# Patient Record
Sex: FEMALE | Race: WHITE | NOT HISPANIC OR LATINO | Employment: UNEMPLOYED | URBAN - METROPOLITAN AREA
[De-identification: names, ages, dates, MRNs, and addresses within clinical notes are randomized per-mention and may not be internally consistent; named-entity substitution may affect disease eponyms.]

---

## 2022-08-11 ENCOUNTER — INITIAL PRENATAL (OUTPATIENT)
Dept: OBSTETRICS AND GYNECOLOGY | Facility: CLINIC | Age: 32
End: 2022-08-11

## 2022-08-11 VITALS — SYSTOLIC BLOOD PRESSURE: 120 MMHG | WEIGHT: 194 LBS | DIASTOLIC BLOOD PRESSURE: 72 MMHG

## 2022-08-11 DIAGNOSIS — Z34.01 ENCOUNTER FOR SUPERVISION OF NORMAL FIRST PREGNANCY IN FIRST TRIMESTER: Primary | ICD-10-CM

## 2022-08-11 LAB
GLUCOSE UR STRIP-MCNC: NEGATIVE MG/DL
PROT UR STRIP-MCNC: NEGATIVE MG/DL

## 2022-08-11 PROCEDURE — 0501F PRENATAL FLOW SHEET: CPT | Performed by: OBSTETRICS & GYNECOLOGY

## 2022-08-11 NOTE — PROGRESS NOTES
CC initial prenatal visit for this 32-year-old G1, P0 female currently using LMP EDC 3/31/23.  We will get ultrasound today to verify date and viability.  Patient does have a 10-year history of a right ovarian cyst without blood flow that was felt to be possible dermoid or endometrioma in [place].  She will be age 32 at delivery and may want Materna 21 testing but unsure about CF testing  Review of Systems - ENT ROS: negative  Hematological and Lymphatic ROS: negative  Endocrine ROS: negative  Breast ROS: negative  Respiratory ROS: negative  Cardiovascular ROS: negative  Gastrointestinal ROS: negative except nausea  Genito-Urinary ROS: negative  Musculoskeletal ROS: negative  Neurological ROS: negative  Dermatological ROS: negative  Assessment.  6 weeks 6 days gestation with low risk pregnancy  Plan.  Ultrasound for dating and viability today.  Routine prenatal labs.  Possible CF testing with Materna 21 next visit in 4 weeks.

## 2022-08-12 LAB
ABO GROUP BLD: NORMAL
BACTERIA UR CULT: NORMAL
BACTERIA UR CULT: NORMAL
BASOPHILS # BLD AUTO: 0 X10E3/UL (ref 0–0.2)
BASOPHILS NFR BLD AUTO: 0 %
BLD GP AB SCN SERPL QL: NEGATIVE
EOSINOPHIL # BLD AUTO: 0.1 X10E3/UL (ref 0–0.4)
EOSINOPHIL NFR BLD AUTO: 1 %
ERYTHROCYTE [DISTWIDTH] IN BLOOD BY AUTOMATED COUNT: 13 % (ref 11.7–15.4)
EST. AVERAGE GLUCOSE BLD GHB EST-MCNC: 100 MG/DL
FT4I SERPL CALC-MCNC: 2.6 (ref 1.2–4.9)
HBA1C MFR BLD: 5.1 % (ref 4.8–5.6)
HBV SURFACE AG SERPL QL IA: NEGATIVE
HCT VFR BLD AUTO: 38.7 % (ref 34–46.6)
HCV AB S/CO SERPL IA: 0.2 S/CO RATIO (ref 0–0.9)
HGB A MFR BLD ELPH: 97.6 % (ref 96.4–98.8)
HGB A2 MFR BLD ELPH: 2.4 % (ref 1.8–3.2)
HGB BLD-MCNC: 13.2 G/DL (ref 11.1–15.9)
HGB F MFR BLD ELPH: 0 % (ref 0–2)
HGB FRACT BLD-IMP: NORMAL
HGB S MFR BLD ELPH: 0 %
HIV 1+2 AB+HIV1 P24 AG SERPL QL IA: NON REACTIVE
IMM GRANULOCYTES # BLD AUTO: 0 X10E3/UL (ref 0–0.1)
IMM GRANULOCYTES NFR BLD AUTO: 0 %
LYMPHOCYTES # BLD AUTO: 2.9 X10E3/UL (ref 0.7–3.1)
LYMPHOCYTES NFR BLD AUTO: 34 %
MCH RBC QN AUTO: 29.3 PG (ref 26.6–33)
MCHC RBC AUTO-ENTMCNC: 34.1 G/DL (ref 31.5–35.7)
MCV RBC AUTO: 86 FL (ref 79–97)
MONOCYTES # BLD AUTO: 0.6 X10E3/UL (ref 0.1–0.9)
MONOCYTES NFR BLD AUTO: 7 %
NEUTROPHILS # BLD AUTO: 5 X10E3/UL (ref 1.4–7)
NEUTROPHILS NFR BLD AUTO: 58 %
PLATELET # BLD AUTO: 268 X10E3/UL (ref 150–450)
RBC # BLD AUTO: 4.5 X10E6/UL (ref 3.77–5.28)
RH BLD: POSITIVE
RPR SER QL: NON REACTIVE
RUBV IGG SERPL IA-ACNC: 8.58 INDEX
T3RU NFR SERPL: 19 % (ref 24–39)
T4 SERPL-MCNC: 13.9 UG/DL (ref 4.5–12)
TSH SERPL DL<=0.005 MIU/L-ACNC: 2.04 UIU/ML (ref 0.45–4.5)
WBC # BLD AUTO: 8.7 X10E3/UL (ref 3.4–10.8)

## 2022-08-13 LAB
AMPHETAMINES UR QL SCN: NEGATIVE NG/ML
BARBITURATES UR QL SCN: NEGATIVE NG/ML
BENZODIAZ UR QL: NEGATIVE NG/ML
BZE UR QL: NEGATIVE NG/ML
CANNABINOIDS UR QL SCN: NEGATIVE NG/ML
METHADONE UR QL SCN: NEGATIVE NG/ML
OPIATES UR QL: NEGATIVE NG/ML
PCP UR QL: NEGATIVE NG/ML
PROPOXYPH UR QL SCN: NEGATIVE NG/ML

## 2022-08-15 LAB
C TRACH RRNA CVX QL NAA+PROBE: NEGATIVE
CONV .: NORMAL
CYTOLOGIST CVX/VAG CYTO: NORMAL
CYTOLOGY CVX/VAG DOC CYTO: NORMAL
CYTOLOGY CVX/VAG DOC THIN PREP: NORMAL
DX ICD CODE: NORMAL
HIV 1 & 2 AB SER-IMP: NORMAL
N GONORRHOEA RRNA CVX QL NAA+PROBE: NEGATIVE
OTHER STN SPEC: NORMAL
STAT OF ADQ CVX/VAG CYTO-IMP: NORMAL

## 2022-09-12 ENCOUNTER — ROUTINE PRENATAL (OUTPATIENT)
Dept: OBSTETRICS AND GYNECOLOGY | Facility: CLINIC | Age: 32
End: 2022-09-12

## 2022-09-12 VITALS — SYSTOLIC BLOOD PRESSURE: 120 MMHG | WEIGHT: 193 LBS | DIASTOLIC BLOOD PRESSURE: 82 MMHG

## 2022-09-12 DIAGNOSIS — Z34.01 ENCOUNTER FOR SUPERVISION OF NORMAL FIRST PREGNANCY IN FIRST TRIMESTER: Primary | ICD-10-CM

## 2022-09-12 LAB
GLUCOSE UR STRIP-MCNC: NEGATIVE MG/DL
PROT UR STRIP-MCNC: NEGATIVE MG/DL

## 2022-09-12 PROCEDURE — 0502F SUBSEQUENT PRENATAL CARE: CPT | Performed by: OBSTETRICS & GYNECOLOGY

## 2022-09-12 NOTE — PROGRESS NOTES
CC follow-up OB visit.  Ultrasound today fetal heart rate 165.  11 weeks 1 day.  Stable 2.4 cm complex right ovarian cyst.  We will keep LMP EDC 3/31/2023.  Normal labs today with normal hemoglobin electrophoresis and hemoglobin A1c.  Right ovarian cyst has been present per patient for years.  Patient does want CF and Materna 21 testing.  No current problems.  Assessment.  11 weeks 3 days gestation with persistent right ovarian cyst  Plan.  Materna 21 and Inheritest Core today.  Return to office 4 weeks.

## 2022-09-19 ENCOUNTER — TELEPHONE (OUTPATIENT)
Dept: OBSTETRICS AND GYNECOLOGY | Facility: CLINIC | Age: 32
End: 2022-09-19

## 2022-09-19 NOTE — TELEPHONE ENCOUNTER
----- Message from Chris Rocha MD sent at 9/19/2022  7:47 AM EDT -----  Please tell pt Materna-21 low risk for Down's. It's a boy if wants to know. Thanks nola

## 2022-09-21 LAB
CLINICAL INFO: NORMAL
ETHNIC BACKGROUND STATED: NORMAL
GENE MUT TESTED BLD/T: NORMAL
GENETIC COUNSELOR:: NORMAL
LAB DIRECTOR NAME PROVIDER: NORMAL
MOL DX INTERP BLD/T QL: NORMAL
REASON FOR REFERRAL (NARRATIVE): NORMAL
REF LAB TEST METHOD: NORMAL
SERVICE CMNT 02-IMP: NORMAL
SPECIMEN SOURCE: NORMAL
TEST PERFORMANCE INFO SPEC: NORMAL

## 2022-10-03 ENCOUNTER — TELEPHONE (OUTPATIENT)
Dept: OBSTETRICS AND GYNECOLOGY | Facility: CLINIC | Age: 32
End: 2022-10-03

## 2022-10-03 NOTE — TELEPHONE ENCOUNTER
Provider: DR. FRIDA HORTON  Caller: DANIA CHAMBERS  Relationship to Patient: SELF  Phone Number: [phone]  Reason for Call: HEART FLUTTERING   When did it start: 09/30/22    PT IS 14 1/2 WEEKS PREGNANT - PT'S HEART HAS BEEN FLUTTERING ON AND OFF SINCE Friday, September 30, 2020.  PT IS NOT IN ANY  PAIN - HAS APPT W/ DR. HORTON ON Monday, October 10TH FOR OB FOLLOW UP - PT IS WONDERING IF THERE IS ANYTHING THAT SHE NEEDS TO BE CONCERNED WITH?      PT CAN BE REACHED AT ANYTIME @ THE NUMBER PROVIDED ABOVE - LVM IF NEEDED.

## 2022-10-03 NOTE — TELEPHONE ENCOUNTER
I would explain to patient that at this point in her pregnancy it is not unusual to feel some palpitations or even have her heart racing for short periods of time as her body tries to keep her blood pressure up.  We can definitely check it when she comes in but if it is severe or getting worse she should go to the emergency room to get an EKG.  That is something we are not able to do in the office.

## 2022-10-10 ENCOUNTER — ROUTINE PRENATAL (OUTPATIENT)
Dept: OBSTETRICS AND GYNECOLOGY | Facility: CLINIC | Age: 32
End: 2022-10-10

## 2022-10-10 VITALS — DIASTOLIC BLOOD PRESSURE: 74 MMHG | SYSTOLIC BLOOD PRESSURE: 118 MMHG | WEIGHT: 195 LBS

## 2022-10-10 DIAGNOSIS — Z34.02 ENCOUNTER FOR SUPERVISION OF NORMAL FIRST PREGNANCY IN SECOND TRIMESTER: Primary | ICD-10-CM

## 2022-10-10 PROCEDURE — 0502F SUBSEQUENT PRENATAL CARE: CPT | Performed by: OBSTETRICS & GYNECOLOGY

## 2022-10-10 NOTE — PROGRESS NOTES
CC OB follow-up visit.  Materna 21 and CF testing normal.  Does want AFP today.  No current problems.  Patient has occasional palpitations but they are getting better.  Heart regular rate and rhythm without murmur  Assessment.  15 weeks 3 days gestation with low risk pregnancy  Plan.  Return to office 4 weeks with anatomy scan.  aFP today.

## 2022-10-12 LAB
AFP INTERP SERPL-IMP: NORMAL
AFP INTERP SERPL-IMP: NORMAL
AFP MOM SERPL: 0.79
AFP SERPL-MCNC: 21.8 NG/ML
AGE AT DELIVERY: 34.7 YR
GA METHOD: NORMAL
GA: 15.4 WEEKS
IDDM PATIENT QL: NO
LABORATORY COMMENT REPORT: NORMAL
MULTIPLE PREGNANCY: NO
NEURAL TUBE DEFECT RISK FETUS: NORMAL %
RESULT: NORMAL

## 2022-11-14 ENCOUNTER — ROUTINE PRENATAL (OUTPATIENT)
Dept: OBSTETRICS AND GYNECOLOGY | Facility: CLINIC | Age: 32
End: 2022-11-14

## 2022-11-14 VITALS
WEIGHT: 197.8 LBS | SYSTOLIC BLOOD PRESSURE: 130 MMHG | BODY MASS INDEX: 36.4 KG/M2 | HEIGHT: 62 IN | DIASTOLIC BLOOD PRESSURE: 69 MMHG

## 2022-11-14 DIAGNOSIS — Z34.02 ENCOUNTER FOR SUPERVISION OF NORMAL FIRST PREGNANCY IN SECOND TRIMESTER: Primary | ICD-10-CM

## 2022-11-14 LAB
GLUCOSE UR STRIP-MCNC: NEGATIVE MG/DL
PROT UR STRIP-MCNC: NEGATIVE MG/DL

## 2022-11-14 PROCEDURE — 0502F SUBSEQUENT PRENATAL CARE: CPT | Performed by: OBSTETRICS & GYNECOLOGY

## 2022-11-14 NOTE — PROGRESS NOTES
CC follow-up OB visit.  aFP low risk for spina bifida.  Normal anatomy scan today.  Fetal heart rate 140.  Cervical length 4 cm.  Same right dermoid cyst noted.  This has been present for years.  Discussed possible removal if  necessary.  Return to office 4 weeks with 1 hour glucose.

## 2022-12-12 ENCOUNTER — ROUTINE PRENATAL (OUTPATIENT)
Dept: OBSTETRICS AND GYNECOLOGY | Facility: CLINIC | Age: 32
End: 2022-12-12

## 2022-12-12 VITALS — WEIGHT: 202 LBS | BODY MASS INDEX: 36.95 KG/M2 | DIASTOLIC BLOOD PRESSURE: 78 MMHG | SYSTOLIC BLOOD PRESSURE: 126 MMHG

## 2022-12-12 DIAGNOSIS — Z34.02 ENCOUNTER FOR SUPERVISION OF NORMAL FIRST PREGNANCY IN SECOND TRIMESTER: ICD-10-CM

## 2022-12-12 LAB
GLUCOSE UR STRIP-MCNC: NEGATIVE MG/DL
PROT UR STRIP-MCNC: NEGATIVE MG/DL

## 2022-12-12 PROCEDURE — 0502F SUBSEQUENT PRENATAL CARE: CPT | Performed by: OBSTETRICS & GYNECOLOGY

## 2022-12-12 PROCEDURE — 90686 IIV4 VACC NO PRSV 0.5 ML IM: CPT | Performed by: OBSTETRICS & GYNECOLOGY

## 2022-12-12 PROCEDURE — 90471 IMMUNIZATION ADMIN: CPT | Performed by: OBSTETRICS & GYNECOLOGY

## 2022-12-12 NOTE — PROGRESS NOTES
CC follow-up OB visit.  AB+ blood type.  1 hour glucose today.  Normal CF testing.  Does want flu shot today and Tdap next visit.  Good fetal movement and growth.  No current problems.  Return to office 4 weeks.

## 2022-12-13 ENCOUNTER — TELEPHONE (OUTPATIENT)
Dept: OBSTETRICS AND GYNECOLOGY | Facility: CLINIC | Age: 32
End: 2022-12-13

## 2022-12-13 DIAGNOSIS — O99.810 ABNORMAL O'SULLIVAN GLUCOSE CHALLENGE TEST, ANTEPARTUM: Primary | ICD-10-CM

## 2022-12-13 LAB
BASOPHILS # BLD AUTO: 0.03 10*3/MM3 (ref 0–0.2)
BASOPHILS NFR BLD AUTO: 0.3 % (ref 0–1.5)
BLD GP AB SCN SERPL QL: NEGATIVE
EOSINOPHIL # BLD AUTO: 0.12 10*3/MM3 (ref 0–0.4)
EOSINOPHIL NFR BLD AUTO: 1.4 % (ref 0.3–6.2)
ERYTHROCYTE [DISTWIDTH] IN BLOOD BY AUTOMATED COUNT: 12.8 % (ref 12.3–15.4)
GLUCOSE 1H P 50 G GLC PO SERPL-MCNC: 161 MG/DL (ref 65–139)
HCT VFR BLD AUTO: 34.8 % (ref 34–46.6)
HGB BLD-MCNC: 12 G/DL (ref 12–15.9)
IMM GRANULOCYTES # BLD AUTO: 0.05 10*3/MM3 (ref 0–0.05)
IMM GRANULOCYTES NFR BLD AUTO: 0.6 % (ref 0–0.5)
LYMPHOCYTES # BLD AUTO: 2.69 10*3/MM3 (ref 0.7–3.1)
LYMPHOCYTES NFR BLD AUTO: 30.6 % (ref 19.6–45.3)
MCH RBC QN AUTO: 29.1 PG (ref 26.6–33)
MCHC RBC AUTO-ENTMCNC: 34.5 G/DL (ref 31.5–35.7)
MCV RBC AUTO: 84.3 FL (ref 79–97)
MONOCYTES # BLD AUTO: 0.39 10*3/MM3 (ref 0.1–0.9)
MONOCYTES NFR BLD AUTO: 4.4 % (ref 5–12)
NEUTROPHILS # BLD AUTO: 5.51 10*3/MM3 (ref 1.7–7)
NEUTROPHILS NFR BLD AUTO: 62.7 % (ref 42.7–76)
NRBC BLD AUTO-RTO: 0 /100 WBC (ref 0–0.2)
PLATELET # BLD AUTO: 243 10*3/MM3 (ref 140–450)
RBC # BLD AUTO: 4.13 10*6/MM3 (ref 3.77–5.28)
WBC # BLD AUTO: 8.79 10*3/MM3 (ref 3.4–10.8)

## 2022-12-13 NOTE — PROGRESS NOTES
Please tell patient that she needs a fasting 3-hour OGTT.  I have placed an order in the computer.  Thank you.

## 2022-12-13 NOTE — TELEPHONE ENCOUNTER
----- Message from Chris Rocha MD sent at 12/13/2022 11:49 AM EST -----  Please tell patient that she needs a fasting 3-hour OGTT.  I have placed an order in the computer.  Thank you.

## 2022-12-19 ENCOUNTER — LAB (OUTPATIENT)
Dept: OBSTETRICS AND GYNECOLOGY | Facility: CLINIC | Age: 32
End: 2022-12-19

## 2022-12-19 DIAGNOSIS — O99.810 ABNORMAL O'SULLIVAN GLUCOSE CHALLENGE TEST, ANTEPARTUM: Primary | ICD-10-CM

## 2022-12-20 ENCOUNTER — TELEPHONE (OUTPATIENT)
Dept: OBSTETRICS AND GYNECOLOGY | Facility: CLINIC | Age: 32
End: 2022-12-20

## 2022-12-20 LAB
GLUCOSE 1H P 100 G GLC PO SERPL-MCNC: 178 MG/DL (ref 70–179)
GLUCOSE 2H P 100 G GLC PO SERPL-MCNC: 137 MG/DL (ref 70–154)
GLUCOSE 3H P 100 G GLC PO SERPL-MCNC: 85 MG/DL (ref 70–139)
GLUCOSE P FAST SERPL-MCNC: 148 MG/DL (ref 70–94)
Lab: ABNORMAL

## 2022-12-20 NOTE — TELEPHONE ENCOUNTER
Pt aware and stated she did not eat or drink anything. If she did has some sips of water when getting up to got to the bathroom, but nothing other than that. Aware we will be doing another fasting blood sugar at her next appt and to not eat or drink anything after midnight. SM

## 2022-12-20 NOTE — PROGRESS NOTES
Fasting 3-hour OGTT normal except fasting blood sugar 148.  We will verify whether patient was actually fasting.  Regardless we will recheck fasting blood sugar next visit.

## 2022-12-20 NOTE — PROGRESS NOTES
Khushbu please find out if this patient was truly fasting when she did her fasting 3-hour OGTT.  Her fasting blood sugar is very high but the rest of her levels are totally normal.  Regardless I will want to do a fasting blood sugar the next time she comes in for an appointment and tell her it is very important that she fasts from after midnight.  Thank you.

## 2022-12-20 NOTE — TELEPHONE ENCOUNTER
----- Message from Chris Rocha MD sent at 12/20/2022  2:49 PM EST -----  Khushbu please find out if this patient was truly fasting when she did her fasting 3-hour OGTT.  Her fasting blood sugar is very high but the rest of her levels are totally normal.  Regardless I will want to do a fasting blood sugar the next time she comes in for an appointment and tell her it is very important that she fasts from after midnight.  Thank you.

## 2023-01-04 ENCOUNTER — HOSPITAL ENCOUNTER (EMERGENCY)
Facility: HOSPITAL | Age: 33
Discharge: HOME | End: 2023-01-04
Attending: EMERGENCY MEDICINE | Admitting: OBSTETRICS & GYNECOLOGY
Payer: COMMERCIAL

## 2023-01-04 ENCOUNTER — TELEPHONE (OUTPATIENT)
Dept: OBSTETRICS AND GYNECOLOGY | Facility: CLINIC | Age: 33
End: 2023-01-04
Payer: COMMERCIAL

## 2023-01-04 VITALS
WEIGHT: 200 LBS | HEIGHT: 61 IN | BODY MASS INDEX: 37.76 KG/M2 | HEART RATE: 86 BPM | SYSTOLIC BLOOD PRESSURE: 121 MMHG | OXYGEN SATURATION: 100 % | RESPIRATION RATE: 16 BRPM | TEMPERATURE: 98.2 F | DIASTOLIC BLOOD PRESSURE: 61 MMHG
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
## 2023-01-04 DIAGNOSIS — Z3A.27 27 WEEKS GESTATION OF PREGNANCY: ICD-10-CM

## 2023-01-04 DIAGNOSIS — S16.1XXA CERVICAL MUSCLE STRAIN, INITIAL ENCOUNTER: ICD-10-CM

## 2023-01-04 LAB
ABO GROUP BLD: NORMAL
APTT PPP: 30.6 SECONDS (ref 22.7–35.4)
BLD GP AB SCN SERPL QL: NEGATIVE
FIBRINOGEN PPP-MCNC: 417 MG/DL (ref 219–464)
RH BLD: POSITIVE
T&S EXPIRATION DATE: NORMAL

## 2023-01-04 PROCEDURE — 85730 THROMBOPLASTIN TIME PARTIAL: CPT | Performed by: OBSTETRICS & GYNECOLOGY

## 2023-01-04 PROCEDURE — 59025 FETAL NON-STRESS TEST: CPT

## 2023-01-04 PROCEDURE — 99202 OFFICE O/P NEW SF 15 MIN: CPT | Performed by: OBSTETRICS & GYNECOLOGY

## 2023-01-04 PROCEDURE — 86850 RBC ANTIBODY SCREEN: CPT | Performed by: OBSTETRICS & GYNECOLOGY

## 2023-01-04 PROCEDURE — 85384 FIBRINOGEN ACTIVITY: CPT | Performed by: OBSTETRICS & GYNECOLOGY

## 2023-01-04 PROCEDURE — 99283 EMERGENCY DEPT VISIT LOW MDM: CPT

## 2023-01-04 PROCEDURE — 86901 BLOOD TYPING SEROLOGIC RH(D): CPT | Performed by: OBSTETRICS & GYNECOLOGY

## 2023-01-04 PROCEDURE — G0378 HOSPITAL OBSERVATION PER HR: HCPCS

## 2023-01-04 PROCEDURE — 86900 BLOOD TYPING SEROLOGIC ABO: CPT | Performed by: OBSTETRICS & GYNECOLOGY

## 2023-01-04 RX ORDER — PRENATAL VIT NO.126/IRON/FOLIC 28MG-0.8MG
1 TABLET ORAL DAILY
COMMUNITY

## 2023-01-04 RX ORDER — CYCLOBENZAPRINE HCL 5 MG
5 TABLET ORAL 3 TIMES DAILY PRN
COMMUNITY
End: 2023-04-03
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
## 2023-01-04 NOTE — NURSING NOTE
Patient discharged home after fetal monitoring and contraction monitoring.  Discussed when and how to perform fetal kick counts and when to call physician. DR. Jacobson already discussed warning signs.   Pt has appt with Dr. Rocha next Monday.
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
## 2023-01-09 ENCOUNTER — ROUTINE PRENATAL (OUTPATIENT)
Dept: OBSTETRICS AND GYNECOLOGY | Facility: CLINIC | Age: 33
End: 2023-01-09
Payer: COMMERCIAL

## 2023-01-09 VITALS — DIASTOLIC BLOOD PRESSURE: 73 MMHG | WEIGHT: 203 LBS | SYSTOLIC BLOOD PRESSURE: 111 MMHG | BODY MASS INDEX: 38.36 KG/M2

## 2023-01-09 DIAGNOSIS — Z34.03 ENCOUNTER FOR SUPERVISION OF NORMAL FIRST PREGNANCY IN THIRD TRIMESTER: Primary | ICD-10-CM

## 2023-01-09 DIAGNOSIS — Z34.03 ENCOUNTER FOR SUPERVISION OF NORMAL FIRST PREGNANCY IN THIRD TRIMESTER: ICD-10-CM

## 2023-01-09 LAB
GLUCOSE UR STRIP-MCNC: NEGATIVE MG/DL
PROT UR STRIP-MCNC: NEGATIVE MG/DL

## 2023-01-09 PROCEDURE — 0502F SUBSEQUENT PRENATAL CARE: CPT | Performed by: OBSTETRICS & GYNECOLOGY

## 2023-01-09 PROCEDURE — 90471 IMMUNIZATION ADMIN: CPT | Performed by: OBSTETRICS & GYNECOLOGY

## 2023-01-09 PROCEDURE — 90715 TDAP VACCINE 7 YRS/> IM: CPT | Performed by: OBSTETRICS & GYNECOLOGY

## 2023-01-09 NOTE — PROGRESS NOTES
CC follow-up OB visit.  AB+.  Elevated fasting blood sugar on otherwise normal 3-hour OGTT.  Rechecking fasting blood sugar today.  Good fetal movement and growth.  Was having some mild flank pain while away over the holidays and did get checked at the ER with normal evaluation including urine.  Her cervix was long and closed when she was checked in the ER.  She was having a few contractions then but that has resolved.  Assessment.  28 weeks 3 days gestation with elevated fasting blood sugar  Plan.  Return to office 3 weeks.  Fasting blood sugar today.  Tdap today.  Already received flu vaccine.

## 2023-01-11 ENCOUNTER — TELEPHONE (OUTPATIENT)
Dept: OBSTETRICS AND GYNECOLOGY | Facility: CLINIC | Age: 33
End: 2023-01-11
Payer: COMMERCIAL

## 2023-01-11 NOTE — TELEPHONE ENCOUNTER
----- Message from Chris Rocha MD sent at 1/11/2023 12:28 PM EST -----  Please tell pt her FBS was totally normal. Thanks JONES

## 2023-01-16 LAB — GLUCOSE P FAST SERPL-MCNC: 82 MG/DL (ref 70–99)

## 2023-01-30 ENCOUNTER — ROUTINE PRENATAL (OUTPATIENT)
Dept: OBSTETRICS AND GYNECOLOGY | Facility: CLINIC | Age: 33
End: 2023-01-30
Payer: COMMERCIAL

## 2023-01-30 VITALS — DIASTOLIC BLOOD PRESSURE: 72 MMHG | SYSTOLIC BLOOD PRESSURE: 122 MMHG

## 2023-01-30 DIAGNOSIS — Z34.03 ENCOUNTER FOR SUPERVISION OF NORMAL FIRST PREGNANCY IN THIRD TRIMESTER: Primary | ICD-10-CM

## 2023-01-30 LAB
GLUCOSE UR STRIP-MCNC: NEGATIVE MG/DL
PROT UR STRIP-MCNC: NEGATIVE MG/DL

## 2023-01-30 PROCEDURE — 0502F SUBSEQUENT PRENATAL CARE: CPT | Performed by: OBSTETRICS & GYNECOLOGY

## 2023-01-30 NOTE — PROGRESS NOTES
CC follow-up OB visit.  Normal fasting blood sugar.  AB+ blood type.  Good fetal movement and growth.  No current problems.  Already received Tdap.  Assessment.  31 weeks 3 days gestation with good movement and growth  Plan.  Return to office 2 weeks.

## 2023-02-13 ENCOUNTER — ROUTINE PRENATAL (OUTPATIENT)
Dept: OBSTETRICS AND GYNECOLOGY | Facility: CLINIC | Age: 33
End: 2023-02-13
Payer: COMMERCIAL

## 2023-02-13 VITALS — SYSTOLIC BLOOD PRESSURE: 115 MMHG | DIASTOLIC BLOOD PRESSURE: 76 MMHG | WEIGHT: 210 LBS | BODY MASS INDEX: 39.68 KG/M2

## 2023-02-13 DIAGNOSIS — Z34.03 ENCOUNTER FOR SUPERVISION OF NORMAL FIRST PREGNANCY IN THIRD TRIMESTER: Primary | ICD-10-CM

## 2023-02-13 LAB
GLUCOSE UR STRIP-MCNC: NEGATIVE MG/DL
PROT UR STRIP-MCNC: NEGATIVE MG/DL

## 2023-02-13 PROCEDURE — 0502F SUBSEQUENT PRENATAL CARE: CPT | Performed by: OBSTETRICS & GYNECOLOGY

## 2023-02-13 NOTE — PROGRESS NOTES
CC follow-up OB visit.  Good fetal movement and growth.  Already received Tdap.  No current problems.  Assessment.  33 weeks 3 days gestation with good growth  Plan.  Return to office 2 weeks.  Planning ultrasound for growth and repeat hemoglobin A1c around 36 weeks.

## 2023-03-01 ENCOUNTER — ROUTINE PRENATAL (OUTPATIENT)
Dept: OBSTETRICS AND GYNECOLOGY | Facility: CLINIC | Age: 33
End: 2023-03-01
Payer: COMMERCIAL

## 2023-03-01 VITALS — WEIGHT: 214 LBS | DIASTOLIC BLOOD PRESSURE: 82 MMHG | SYSTOLIC BLOOD PRESSURE: 126 MMHG | BODY MASS INDEX: 40.43 KG/M2

## 2023-03-01 DIAGNOSIS — Z34.03 ENCOUNTER FOR SUPERVISION OF NORMAL FIRST PREGNANCY IN THIRD TRIMESTER: Primary | ICD-10-CM

## 2023-03-01 DIAGNOSIS — R73.9 ELEVATED BLOOD SUGAR: ICD-10-CM

## 2023-03-01 LAB
GLUCOSE UR STRIP-MCNC: NEGATIVE MG/DL
PROT UR STRIP-MCNC: NEGATIVE MG/DL

## 2023-03-01 PROCEDURE — 0502F SUBSEQUENT PRENATAL CARE: CPT | Performed by: OBSTETRICS & GYNECOLOGY

## 2023-03-01 NOTE — PROGRESS NOTES
CC follow-up OB visit.  Good fetal movement and growth.  No current problems.  Planning hemoglobin A1c and ultrasound estimated fetal weight next week due to 1 elevated fasting blood sugar.  Assessment.  35 weeks 5 days gestation with good growth, 1 elevated fasting blood sugar with subsequent normal fasting blood sugar  Plan.  Return to office 1 week with ultrasound.  Hemoglobin A1c.  Discussed GBS testing next visit in detail

## 2023-03-08 ENCOUNTER — ROUTINE PRENATAL (OUTPATIENT)
Dept: OBSTETRICS AND GYNECOLOGY | Facility: CLINIC | Age: 33
End: 2023-03-08
Payer: COMMERCIAL

## 2023-03-08 VITALS — SYSTOLIC BLOOD PRESSURE: 126 MMHG | DIASTOLIC BLOOD PRESSURE: 84 MMHG | BODY MASS INDEX: 40.81 KG/M2 | WEIGHT: 216 LBS

## 2023-03-08 DIAGNOSIS — Z34.03 ENCOUNTER FOR SUPERVISION OF NORMAL FIRST PREGNANCY IN THIRD TRIMESTER: Primary | ICD-10-CM

## 2023-03-08 DIAGNOSIS — O36.5939 SGA (SMALL FOR GESTATIONAL AGE), FETAL, AFFECTING CARE OF MOTHER, ANTEPARTUM, THIRD TRIMESTER, OTHER FETUS: ICD-10-CM

## 2023-03-08 LAB
EST. AVERAGE GLUCOSE BLD GHB EST-MCNC: 116.89 MG/DL
GLUCOSE UR STRIP-MCNC: NEGATIVE MG/DL
HBA1C MFR BLD: 5.7 % (ref 4.8–5.6)
PROT UR STRIP-MCNC: NEGATIVE MG/DL

## 2023-03-08 PROCEDURE — 0502F SUBSEQUENT PRENATAL CARE: CPT | Performed by: OBSTETRICS & GYNECOLOGY

## 2023-03-08 NOTE — PROGRESS NOTES
CC follow-up OB visit.  Patient with 1 elevated fasting blood sugar.  Hemoglobin A1c plan today.  Ultrasound today estimated weight 6 pounds again.  27 percentile.  AC 12 percentile.  KATHERINE 13 cm.  Vertex.  Fetal heart rate 129.  Good fetal movement.  GBS explained and performed.  Cervix soft but closed.  Assessment.  36 weeks 5 days gestation with slightly small estimated weight and abdominal circumference, 1 elevated fasting blood sugar  Plan.  Return to office weekly with BPP ultrasound.  Hemoglobin A1c today.  GBS performed.

## 2023-03-09 ENCOUNTER — TELEPHONE (OUTPATIENT)
Dept: OBSTETRICS AND GYNECOLOGY | Facility: CLINIC | Age: 33
End: 2023-03-09
Payer: COMMERCIAL

## 2023-03-09 NOTE — PROGRESS NOTES
Please tell patient that her hemoglobin A1c was just  mildly elevated 5.7.  We are probably dealing with some very borderline gestational diabetes.  I would have her watch her sugars, but not do anything else at this late gestation except for weekly BPP ultrasounds.

## 2023-03-09 NOTE — PROGRESS NOTES
Hemoglobin A1c 5.7, mildly elevated.  Patient will watch sugars and we will perform BPP ultrasounds weekly.  No evidence of macrosomia on ultrasound.

## 2023-03-09 NOTE — TELEPHONE ENCOUNTER
----- Message from Chris Rocha MD sent at 3/9/2023  1:02 PM EST -----  Please tell patient that her hemoglobin A1c was just  mildly elevated 5.7.  We are probably dealing with some very borderline gestational diabetes.  I would have her watch her sugars, but not do anything else at this late gestation except for weekly BPP ultrasounds.

## 2023-03-10 LAB — GP B STREP DNA SPEC QL NAA+PROBE: NEGATIVE

## 2023-03-15 ENCOUNTER — ROUTINE PRENATAL (OUTPATIENT)
Dept: OBSTETRICS AND GYNECOLOGY | Facility: CLINIC | Age: 33
End: 2023-03-15
Payer: COMMERCIAL

## 2023-03-15 VITALS — WEIGHT: 215 LBS | BODY MASS INDEX: 40.62 KG/M2 | DIASTOLIC BLOOD PRESSURE: 86 MMHG | SYSTOLIC BLOOD PRESSURE: 128 MMHG

## 2023-03-15 DIAGNOSIS — I10 MILD HYPERTENSION: ICD-10-CM

## 2023-03-15 DIAGNOSIS — O24.410 DIET CONTROLLED GESTATIONAL DIABETES MELLITUS (GDM) IN THIRD TRIMESTER: ICD-10-CM

## 2023-03-15 DIAGNOSIS — Z34.03 ENCOUNTER FOR SUPERVISION OF NORMAL FIRST PREGNANCY IN THIRD TRIMESTER: Primary | ICD-10-CM

## 2023-03-15 LAB
GLUCOSE UR STRIP-MCNC: NEGATIVE MG/DL
PROT UR STRIP-MCNC: NEGATIVE MG/DL

## 2023-03-15 PROCEDURE — 0502F SUBSEQUENT PRENATAL CARE: CPT | Performed by: OBSTETRICS & GYNECOLOGY

## 2023-03-15 NOTE — PROGRESS NOTES
CC follow-up OB visit.  BPP today 8/8.  KATHERINE 12 cm.  Fetal heart rate 146.  Vertex.  Good fetal movement and growth.  Cervix unchanged.  Occasional headaches.  Assessment 37 weeks 5 days gestation, mild gestational diabetes diet-controlled, borderline hypertension, borderline SGA  Plan.  CBC, CMP, PC ratio, return to office Monday with BPP ultrasound and estimated weight.

## 2023-03-16 ENCOUNTER — TELEPHONE (OUTPATIENT)
Dept: OBSTETRICS AND GYNECOLOGY | Facility: CLINIC | Age: 33
End: 2023-03-16
Payer: COMMERCIAL

## 2023-03-16 LAB
ALBUMIN SERPL-MCNC: 3.4 G/DL (ref 3.5–5.2)
ALBUMIN/GLOB SERPL: 1.2 G/DL
ALP SERPL-CCNC: 161 U/L (ref 39–117)
ALT SERPL-CCNC: 13 U/L (ref 1–33)
AST SERPL-CCNC: 13 U/L (ref 1–32)
BASOPHILS # BLD AUTO: 0.03 10*3/MM3 (ref 0–0.2)
BASOPHILS NFR BLD AUTO: 0.3 % (ref 0–1.5)
BILIRUB SERPL-MCNC: 0.3 MG/DL (ref 0–1.2)
BUN SERPL-MCNC: 9 MG/DL (ref 6–20)
BUN/CREAT SERPL: 13.8 (ref 7–25)
CALCIUM SERPL-MCNC: 9.6 MG/DL (ref 8.6–10.5)
CHLORIDE SERPL-SCNC: 102 MMOL/L (ref 98–107)
CO2 SERPL-SCNC: 22.4 MMOL/L (ref 22–29)
CREAT SERPL-MCNC: 0.65 MG/DL (ref 0.57–1)
CREAT UR-MCNC: 115 MG/DL
EGFRCR SERPLBLD CKD-EPI 2021: 118.7 ML/MIN/1.73
EOSINOPHIL # BLD AUTO: 0.08 10*3/MM3 (ref 0–0.4)
EOSINOPHIL NFR BLD AUTO: 0.8 % (ref 0.3–6.2)
ERYTHROCYTE [DISTWIDTH] IN BLOOD BY AUTOMATED COUNT: 14.2 % (ref 12.3–15.4)
GLOBULIN SER CALC-MCNC: 2.9 GM/DL
GLUCOSE SERPL-MCNC: 74 MG/DL (ref 65–99)
HCT VFR BLD AUTO: 39 % (ref 34–46.6)
HGB BLD-MCNC: 12.7 G/DL (ref 12–15.9)
IMM GRANULOCYTES # BLD AUTO: 0.06 10*3/MM3 (ref 0–0.05)
IMM GRANULOCYTES NFR BLD AUTO: 0.6 % (ref 0–0.5)
LYMPHOCYTES # BLD AUTO: 2.5 10*3/MM3 (ref 0.7–3.1)
LYMPHOCYTES NFR BLD AUTO: 25.7 % (ref 19.6–45.3)
MCH RBC QN AUTO: 27.8 PG (ref 26.6–33)
MCHC RBC AUTO-ENTMCNC: 32.6 G/DL (ref 31.5–35.7)
MCV RBC AUTO: 85.3 FL (ref 79–97)
MONOCYTES # BLD AUTO: 0.58 10*3/MM3 (ref 0.1–0.9)
MONOCYTES NFR BLD AUTO: 6 % (ref 5–12)
NEUTROPHILS # BLD AUTO: 6.49 10*3/MM3 (ref 1.7–7)
NEUTROPHILS NFR BLD AUTO: 66.6 % (ref 42.7–76)
NRBC BLD AUTO-RTO: 0 /100 WBC (ref 0–0.2)
PLATELET # BLD AUTO: 206 10*3/MM3 (ref 140–450)
POTASSIUM SERPL-SCNC: 4.3 MMOL/L (ref 3.5–5.2)
PROT SERPL-MCNC: 6.3 G/DL (ref 6–8.5)
PROT UR-MCNC: 22.7 MG/DL
PROT/CREAT UR: 197.4 MG/G CREA (ref 0–200)
RBC # BLD AUTO: 4.57 10*6/MM3 (ref 3.77–5.28)
SODIUM SERPL-SCNC: 137 MMOL/L (ref 136–145)
WBC # BLD AUTO: 9.74 10*3/MM3 (ref 3.4–10.8)

## 2023-03-16 NOTE — PROGRESS NOTES
Please tell patient that her labs came back normal .  Nothing came back suspicious for preeclampsia.

## 2023-03-16 NOTE — TELEPHONE ENCOUNTER
----- Message from Chris Rocha MD sent at 3/16/2023 12:30 PM EDT -----  Please tell patient that her labs came back normal .  Nothing came back suspicious for preeclampsia.

## 2023-03-20 ENCOUNTER — ROUTINE PRENATAL (OUTPATIENT)
Dept: OBSTETRICS AND GYNECOLOGY | Facility: CLINIC | Age: 33
End: 2023-03-20
Payer: COMMERCIAL

## 2023-03-20 VITALS — SYSTOLIC BLOOD PRESSURE: 126 MMHG | BODY MASS INDEX: 40.81 KG/M2 | WEIGHT: 216 LBS | DIASTOLIC BLOOD PRESSURE: 86 MMHG

## 2023-03-20 DIAGNOSIS — Z34.03 ENCOUNTER FOR SUPERVISION OF NORMAL FIRST PREGNANCY IN THIRD TRIMESTER: Primary | ICD-10-CM

## 2023-03-20 LAB
GLUCOSE UR STRIP-MCNC: NEGATIVE MG/DL
PROT UR STRIP-MCNC: NEGATIVE MG/DL

## 2023-03-20 PROCEDURE — 0502F SUBSEQUENT PRENATAL CARE: CPT | Performed by: OBSTETRICS & GYNECOLOGY

## 2023-03-20 NOTE — PROGRESS NOTES
CC follow-up OB visit.  Ultrasound today 6 pounds 8 ounces.  22 percentile.  AC 15 percentile.  Vertex.  Fetal heart rate 134.  KATHERINE 13 cm.  BPP 8/8.  Reviewing patient's irregular cycles often going 32 to 34 days, will probably need to switch EDC to the first ultrasound EDC 4/6/2023.  Good fetal movement and growth.  GBS negative.  Assessment.  37 weeks 4 days gestation, borderline blood pressure, probable late term gestational diabetes with initial high fasting blood sugar followed by normal fasting blood sugar.  Plan.  Return to office Wednesday for repeat blood pressure check.  May consider induction later in this week or if her blood pressure is elevated.  She will take her blood pressure several times at home. .  Upon carefully reviewing her menstrual history, have decided today to go with her 6-week ultrasound EDC of 4/6/2023.

## 2023-03-22 ENCOUNTER — DOCUMENTATION (OUTPATIENT)
Dept: OBSTETRICS AND GYNECOLOGY | Facility: CLINIC | Age: 33
End: 2023-03-22

## 2023-03-22 ENCOUNTER — ROUTINE PRENATAL (OUTPATIENT)
Dept: OBSTETRICS AND GYNECOLOGY | Facility: CLINIC | Age: 33
End: 2023-03-22
Payer: COMMERCIAL

## 2023-03-22 VITALS — DIASTOLIC BLOOD PRESSURE: 78 MMHG | SYSTOLIC BLOOD PRESSURE: 122 MMHG | BODY MASS INDEX: 40.62 KG/M2 | WEIGHT: 215 LBS

## 2023-03-22 DIAGNOSIS — Z34.03 ENCOUNTER FOR SUPERVISION OF NORMAL FIRST PREGNANCY IN THIRD TRIMESTER: Primary | ICD-10-CM

## 2023-03-22 DIAGNOSIS — O24.410 DIET CONTROLLED GESTATIONAL DIABETES MELLITUS (GDM) IN THIRD TRIMESTER: ICD-10-CM

## 2023-03-22 DIAGNOSIS — O13.3 GESTATIONAL HYPERTENSION, THIRD TRIMESTER: ICD-10-CM

## 2023-03-22 PROCEDURE — 0502F SUBSEQUENT PRENATAL CARE: CPT | Performed by: OBSTETRICS & GYNECOLOGY

## 2023-03-22 NOTE — H&P (VIEW-ONLY)
H&P Note    Patient Identification:  Name: Ann Pyle  Age: 32 y.o.  Sex: female  :  1990  MRN: [id number]                       Chief Complaint: Gestational hypertension with persistent headache    History of Present Illness:   Patient is a 32-year-old primigravida at 37 weeks 6 days gestation who has been seen twice over the past week with persistent headache and borderline elevations of her blood pressure in the 130-140/85-90  range.  Patient did have a PC ratio in the office which was normal.  She has also been followed with late term gestational diabetes.  She had initial elevated 1 hour glucose, and this was followed by a 3-hour OGTT with an elevated fasting blood sugar and remaining levels normal.  A repeat fasting blood sugar was totally normal.  Hemoglobin A1c was therefore performed which was slightly elevated at 5.7.  She is being brought in for Cytotec ripening followed by Pitocin induction of labor mostly due to her persistent headache with borderline elevation of her blood pressures.  BPP performed 2 days ago was 8/8 with estimated fetal weight 6 pounds 8 ounces in the 22 percentile range.  AC was in the 15 percentile range.  Baby was vertex.    Problem List:  [unfilled]  Past Medical History:  No past medical history on file.  Past Surgical History:  Past Surgical History:   Procedure Laterality Date   • WISDOM TOOTH EXTRACTION        Home Meds:  (Not in a hospital admission)    Current Meds:   [unfilled]  Allergies:  No Known Allergies  Immunizations:  Immunization History   Administered Date(s) Administered   • 31-influenza Vac Quardvalent Preservativ 2017   • COVID-19 (MODERNA) 1st, 2nd, 3rd Dose Only 2021, 2021, 2021   • Flu Vaccine Quad PF >36MO 2017   • FluLaval/Fluzone >6mos 2017, 2022   • Influenza Injectable Mdck Pf Quad 2022   • Influenza, Unspecified 2017   • Tdap 2017, 2017, 2023   • flucelvax quad pfs =>4  YRS 12/05/2019     Social History:   Social History     Tobacco Use   • Smoking status: Never   • Smokeless tobacco: Not on file   Substance Use Topics   • Alcohol use: Not Currently      Family History:  Family History   Problem Relation Age of Onset   • Diabetes Father    • Diabetes Maternal Grandfather    • Diabetes Paternal Grandfather         Review of Systems  Pertinent items are noted in HPI.    Objective:  tMax 24 hrs: @TMAX(24)@  Vitals Ranges:   BP: (122)/(78) 122/78  Intake and Output Last 3 Shifts:   [unfilled]    Exam:     General Appearance:    Alert, cooperative, no distress, appears stated age   Head:    Normocephalic, without obvious abnormality, atraumatic   Back:     Symmetric, no curvature, ROM normal, no CVA tenderness   Lungs:     Clear to auscultation bilaterally, respirations unlabored   Chest Wall:    No tenderness or deformity    Heart:    Regular rate and rhythm, S1 and S2 normal, no murmur, rub   or gallop   Breast Exam:    No tenderness, masses, or nipple abnormality   Abdomen:     Soft, non-tender, estimated fetal weight 6-1/2 pounds   Genitalia:    Normal female without lesion, discharge or tenderness.  Cervix in the office this week closed/60% effaced/vertex at -2 station.       Extremities:   Extremities normal, atraumatic, no cyanosis or edema   Skin:   Skin color, texture, turgor normal, no rashes or lesions           Data Review:  As above   Lab Results (last 24 hours)     ** No results found for the last 24 hours. **        Assessment:    * No active hospital problems. *      1.  37 weeks 6 days gestation with gestational hypertension and persistent headache  2.  Diet-controlled late term gestational diabetes    Plan:  1.  Planning Cytotec ripening followed by Pitocin induction of labor.    Chris Rocha MD  3/22/2023

## 2023-03-22 NOTE — PROGRESS NOTES
H&P Note    Patient Identification:  Name: Ann Pyle  Age: 32 y.o.  Sex: female  :  1990  MRN: [id number]                       Chief Complaint: Gestational hypertension with persistent headache    History of Present Illness:   Patient is a 32-year-old primigravida at 37 weeks 6 days gestation who has been seen twice over the past week with persistent headache and borderline elevations of her blood pressure in the 130-140/85-90  range.  Patient did have a PC ratio in the office which was normal.  She has also been followed with late term gestational diabetes.  She had initial elevated 1 hour glucose, and this was followed by a 3-hour OGTT with an elevated fasting blood sugar and remaining levels normal.  A repeat fasting blood sugar was totally normal.  Hemoglobin A1c was therefore performed which was slightly elevated at 5.7.  She is being brought in for Cytotec ripening followed by Pitocin induction of labor mostly due to her persistent headache with borderline elevation of her blood pressures.  BPP performed 2 days ago was 8/8 with estimated fetal weight 6 pounds 8 ounces in the 22 percentile range.  AC was in the 15 percentile range.  Baby was vertex.    Problem List:  [unfilled]  Past Medical History:  No past medical history on file.  Past Surgical History:  Past Surgical History:   Procedure Laterality Date   • WISDOM TOOTH EXTRACTION        Home Meds:  (Not in a hospital admission)    Current Meds:   [unfilled]  Allergies:  No Known Allergies  Immunizations:  Immunization History   Administered Date(s) Administered   • 31-influenza Vac Quardvalent Preservativ 2017   • COVID-19 (MODERNA) 1st, 2nd, 3rd Dose Only 2021, 2021, 2021   • Flu Vaccine Quad PF >36MO 2017   • FluLaval/Fluzone >6mos 2017, 2022   • Influenza Injectable Mdck Pf Quad 2022   • Influenza, Unspecified 2017   • Tdap 2017, 2017, 2023   • flucelvax quad pfs =>4  YRS 12/05/2019     Social History:   Social History     Tobacco Use   • Smoking status: Never   • Smokeless tobacco: Not on file   Substance Use Topics   • Alcohol use: Not Currently      Family History:  Family History   Problem Relation Age of Onset   • Diabetes Father    • Diabetes Maternal Grandfather    • Diabetes Paternal Grandfather         Review of Systems  Pertinent items are noted in HPI.    Objective:  tMax 24 hrs: @TMAX(24)@  Vitals Ranges:   BP: (122)/(78) 122/78  Intake and Output Last 3 Shifts:   [unfilled]    Exam:     General Appearance:    Alert, cooperative, no distress, appears stated age   Head:    Normocephalic, without obvious abnormality, atraumatic   Back:     Symmetric, no curvature, ROM normal, no CVA tenderness   Lungs:     Clear to auscultation bilaterally, respirations unlabored   Chest Wall:    No tenderness or deformity    Heart:    Regular rate and rhythm, S1 and S2 normal, no murmur, rub   or gallop   Breast Exam:    No tenderness, masses, or nipple abnormality   Abdomen:     Soft, non-tender, estimated fetal weight 6-1/2 pounds   Genitalia:    Normal female without lesion, discharge or tenderness.  Cervix in the office this week closed/60% effaced/vertex at -2 station.       Extremities:   Extremities normal, atraumatic, no cyanosis or edema   Skin:   Skin color, texture, turgor normal, no rashes or lesions           Data Review:  As above   Lab Results (last 24 hours)     ** No results found for the last 24 hours. **        Assessment:    * No active hospital problems. *      1.  37 weeks 6 days gestation with gestational hypertension and persistent headache  2.  Diet-controlled late term gestational diabetes    Plan:  1.  Planning Cytotec ripening followed by Pitocin induction of labor.    Chris Rocha MD  3/22/2023

## 2023-03-22 NOTE — PROGRESS NOTES
CC follow-up OB visit.  Occasional blood pressure at home 130s over 80s to 90.  Patient has had some persistent headaches over the last week though.  Good fetal movement.  GBS negative.  Assessment.  37 weeks 6 days gestation with gestational hypertension and recent persistent headaches, diet-controlled gestational diabetes  Plan.  Due to the persistent headaches with borderline elevated blood pressures, will bring in this evening for Cytotec ripening followed by Pitocin induction of labor.  Patient has been fully explained the process.  She understands that baby is a little premature but with recent increase in headaches and blood pressure elevation I feel we should move towards delivery.

## 2023-03-23 ENCOUNTER — ANESTHESIA EVENT (OUTPATIENT)
Dept: LABOR AND DELIVERY | Facility: HOSPITAL | Age: 33
End: 2023-03-23
Payer: COMMERCIAL

## 2023-03-23 ENCOUNTER — HOSPITAL ENCOUNTER (INPATIENT)
Facility: HOSPITAL | Age: 33
LOS: 4 days | Discharge: HOME | End: 2023-03-27
Attending: OBSTETRICS & GYNECOLOGY | Admitting: OBSTETRICS & GYNECOLOGY
Payer: COMMERCIAL

## 2023-03-23 ENCOUNTER — ANESTHESIA (OUTPATIENT)
Dept: LABOR AND DELIVERY | Facility: HOSPITAL | Age: 33
End: 2023-03-23
Payer: COMMERCIAL

## 2023-03-23 PROBLEM — Z34.90 PREGNANCY: Status: RESOLVED | Noted: 2023-03-23 | Resolved: 2023-03-23

## 2023-03-23 PROBLEM — O24.410 DIET CONTROLLED GESTATIONAL DIABETES MELLITUS (GDM) IN THIRD TRIMESTER: Status: ACTIVE | Noted: 2023-03-23

## 2023-03-23 PROBLEM — Z34.90 PREGNANCY: Status: ACTIVE | Noted: 2023-03-23

## 2023-03-23 PROBLEM — O13.3 GESTATIONAL HYPERTENSION WITHOUT SIGNIFICANT PROTEINURIA IN THIRD TRIMESTER: Status: ACTIVE | Noted: 2023-03-23

## 2023-03-23 LAB
ABO GROUP BLD: NORMAL
ALBUMIN SERPL-MCNC: 3.2 G/DL (ref 3.5–5.2)
ALBUMIN/GLOB SERPL: 1.1 G/DL
ALP SERPL-CCNC: 163 U/L (ref 39–117)
ALT SERPL W P-5'-P-CCNC: 14 U/L (ref 1–33)
ANION GAP SERPL CALCULATED.3IONS-SCNC: 10 MMOL/L (ref 5–15)
AST SERPL-CCNC: 15 U/L (ref 1–32)
BASOPHILS # BLD AUTO: 0.03 10*3/MM3 (ref 0–0.2)
BASOPHILS NFR BLD AUTO: 0.3 % (ref 0–1.5)
BILIRUB SERPL-MCNC: 0.2 MG/DL (ref 0–1.2)
BLD GP AB SCN SERPL QL: NEGATIVE
BUN SERPL-MCNC: 13 MG/DL (ref 6–20)
BUN/CREAT SERPL: 26 (ref 7–25)
CALCIUM SPEC-SCNC: 8.5 MG/DL (ref 8.6–10.5)
CHLORIDE SERPL-SCNC: 108 MMOL/L (ref 98–107)
CO2 SERPL-SCNC: 21 MMOL/L (ref 22–29)
CREAT SERPL-MCNC: 0.5 MG/DL (ref 0.57–1)
DEPRECATED RDW RBC AUTO: 43.5 FL (ref 37–54)
EGFRCR SERPLBLD CKD-EPI 2021: 126.4 ML/MIN/1.73
EOSINOPHIL # BLD AUTO: 0.09 10*3/MM3 (ref 0–0.4)
EOSINOPHIL NFR BLD AUTO: 1 % (ref 0.3–6.2)
ERYTHROCYTE [DISTWIDTH] IN BLOOD BY AUTOMATED COUNT: 14.1 % (ref 12.3–15.4)
GLOBULIN UR ELPH-MCNC: 2.8 GM/DL
GLUCOSE BLDC GLUCOMTR-MCNC: 86 MG/DL (ref 70–130)
GLUCOSE SERPL-MCNC: 103 MG/DL (ref 65–99)
HCT VFR BLD AUTO: 35.3 % (ref 34–46.6)
HGB BLD-MCNC: 12.1 G/DL (ref 12–15.9)
IMM GRANULOCYTES # BLD AUTO: 0.04 10*3/MM3 (ref 0–0.05)
IMM GRANULOCYTES NFR BLD AUTO: 0.4 % (ref 0–0.5)
LYMPHOCYTES # BLD AUTO: 2.98 10*3/MM3 (ref 0.7–3.1)
LYMPHOCYTES NFR BLD AUTO: 32.7 % (ref 19.6–45.3)
MCH RBC QN AUTO: 29 PG (ref 26.6–33)
MCHC RBC AUTO-ENTMCNC: 34.3 G/DL (ref 31.5–35.7)
MCV RBC AUTO: 84.7 FL (ref 79–97)
MONOCYTES # BLD AUTO: 0.65 10*3/MM3 (ref 0.1–0.9)
MONOCYTES NFR BLD AUTO: 7.1 % (ref 5–12)
NEUTROPHILS NFR BLD AUTO: 5.33 10*3/MM3 (ref 1.7–7)
NEUTROPHILS NFR BLD AUTO: 58.5 % (ref 42.7–76)
NRBC BLD AUTO-RTO: 0 /100 WBC (ref 0–0.2)
PLATELET # BLD AUTO: 193 10*3/MM3 (ref 140–450)
PMV BLD AUTO: 10.6 FL (ref 6–12)
POTASSIUM SERPL-SCNC: 3.6 MMOL/L (ref 3.5–5.2)
PROT SERPL-MCNC: 6 G/DL (ref 6–8.5)
RBC # BLD AUTO: 4.17 10*6/MM3 (ref 3.77–5.28)
RH BLD: POSITIVE
SODIUM SERPL-SCNC: 139 MMOL/L (ref 136–145)
T&S EXPIRATION DATE: NORMAL
WBC NRBC COR # BLD: 9.12 10*3/MM3 (ref 3.4–10.8)

## 2023-03-23 PROCEDURE — 80053 COMPREHEN METABOLIC PANEL: CPT | Performed by: OBSTETRICS & GYNECOLOGY

## 2023-03-23 PROCEDURE — 25010000002 ROPIVACAINE PER 1 MG: Performed by: ANESTHESIOLOGY

## 2023-03-23 PROCEDURE — 86900 BLOOD TYPING SEROLOGIC ABO: CPT | Performed by: OBSTETRICS & GYNECOLOGY

## 2023-03-23 PROCEDURE — 86901 BLOOD TYPING SEROLOGIC RH(D): CPT | Performed by: OBSTETRICS & GYNECOLOGY

## 2023-03-23 PROCEDURE — C1755 CATHETER, INTRASPINAL: HCPCS | Performed by: ANESTHESIOLOGY

## 2023-03-23 PROCEDURE — 86850 RBC ANTIBODY SCREEN: CPT | Performed by: OBSTETRICS & GYNECOLOGY

## 2023-03-23 PROCEDURE — 82962 GLUCOSE BLOOD TEST: CPT

## 2023-03-23 PROCEDURE — 85025 COMPLETE CBC W/AUTO DIFF WBC: CPT | Performed by: OBSTETRICS & GYNECOLOGY

## 2023-03-23 RX ORDER — ACETAMINOPHEN 325 MG/1
650 TABLET ORAL EVERY 4 HOURS PRN
Status: DISCONTINUED | OUTPATIENT
Start: 2023-03-23 | End: 2023-03-24 | Stop reason: HOSPADM

## 2023-03-23 RX ORDER — FAMOTIDINE 10 MG/ML
20 INJECTION, SOLUTION INTRAVENOUS 2 TIMES DAILY PRN
Status: DISCONTINUED | OUTPATIENT
Start: 2023-03-23 | End: 2023-03-24 | Stop reason: HOSPADM

## 2023-03-23 RX ORDER — FAMOTIDINE 10 MG/ML
20 INJECTION, SOLUTION INTRAVENOUS ONCE AS NEEDED
Status: DISCONTINUED | OUTPATIENT
Start: 2023-03-23 | End: 2023-03-24 | Stop reason: HOSPADM

## 2023-03-23 RX ORDER — ROPIVACAINE HYDROCHLORIDE 2 MG/ML
INJECTION, SOLUTION EPIDURAL; INFILTRATION; PERINEURAL AS NEEDED
Status: DISCONTINUED | OUTPATIENT
Start: 2023-03-23 | End: 2023-03-24 | Stop reason: SURG

## 2023-03-23 RX ORDER — LIDOCAINE HYDROCHLORIDE 10 MG/ML
5 INJECTION, SOLUTION EPIDURAL; INFILTRATION; INTRACAUDAL; PERINEURAL AS NEEDED
Status: DISCONTINUED | OUTPATIENT
Start: 2023-03-23 | End: 2023-03-24 | Stop reason: HOSPADM

## 2023-03-23 RX ORDER — ONDANSETRON 2 MG/ML
4 INJECTION INTRAMUSCULAR; INTRAVENOUS ONCE AS NEEDED
Status: DISCONTINUED | OUTPATIENT
Start: 2023-03-23 | End: 2023-03-24 | Stop reason: HOSPADM

## 2023-03-23 RX ORDER — METHYLERGONOVINE MALEATE 0.2 MG/ML
200 INJECTION INTRAVENOUS ONCE AS NEEDED
Status: DISCONTINUED | OUTPATIENT
Start: 2023-03-23 | End: 2023-03-24 | Stop reason: HOSPADM

## 2023-03-23 RX ORDER — SODIUM CHLORIDE, SODIUM LACTATE, POTASSIUM CHLORIDE, CALCIUM CHLORIDE 600; 310; 30; 20 MG/100ML; MG/100ML; MG/100ML; MG/100ML
125 INJECTION, SOLUTION INTRAVENOUS CONTINUOUS
Status: DISCONTINUED | OUTPATIENT
Start: 2023-03-23 | End: 2023-03-24

## 2023-03-23 RX ORDER — OXYTOCIN/0.9 % SODIUM CHLORIDE 30/500 ML
250 PLASTIC BAG, INJECTION (ML) INTRAVENOUS CONTINUOUS
Status: DISPENSED | OUTPATIENT
Start: 2023-03-23 | End: 2023-03-24

## 2023-03-23 RX ORDER — ONDANSETRON 4 MG/1
4 TABLET, FILM COATED ORAL EVERY 6 HOURS PRN
Status: DISCONTINUED | OUTPATIENT
Start: 2023-03-23 | End: 2023-03-24 | Stop reason: HOSPADM

## 2023-03-23 RX ORDER — MAGNESIUM CARB/ALUMINUM HYDROX 105-160MG
30 TABLET,CHEWABLE ORAL ONCE AS NEEDED
Status: COMPLETED | OUTPATIENT
Start: 2023-03-23 | End: 2023-03-24

## 2023-03-23 RX ORDER — CARBOPROST TROMETHAMINE 250 UG/ML
250 INJECTION, SOLUTION INTRAMUSCULAR
Status: DISCONTINUED | OUTPATIENT
Start: 2023-03-23 | End: 2023-03-24 | Stop reason: HOSPADM

## 2023-03-23 RX ORDER — MISOPROSTOL 100 MCG
25 TABLET ORAL
Status: DISPENSED | OUTPATIENT
Start: 2023-03-23 | End: 2023-03-23

## 2023-03-23 RX ORDER — SODIUM CHLORIDE 0.9 % (FLUSH) 0.9 %
10 SYRINGE (ML) INJECTION AS NEEDED
Status: DISCONTINUED | OUTPATIENT
Start: 2023-03-23 | End: 2023-03-24 | Stop reason: HOSPADM

## 2023-03-23 RX ORDER — MISOPROSTOL 200 UG/1
800 TABLET ORAL ONCE AS NEEDED
Status: DISCONTINUED | OUTPATIENT
Start: 2023-03-23 | End: 2023-03-24 | Stop reason: HOSPADM

## 2023-03-23 RX ORDER — SODIUM CHLORIDE 0.9 % (FLUSH) 0.9 %
10 SYRINGE (ML) INJECTION EVERY 12 HOURS SCHEDULED
Status: DISCONTINUED | OUTPATIENT
Start: 2023-03-23 | End: 2023-03-24 | Stop reason: HOSPADM

## 2023-03-23 RX ORDER — ONDANSETRON 2 MG/ML
4 INJECTION INTRAMUSCULAR; INTRAVENOUS EVERY 6 HOURS PRN
Status: DISCONTINUED | OUTPATIENT
Start: 2023-03-23 | End: 2023-03-24 | Stop reason: HOSPADM

## 2023-03-23 RX ORDER — OXYTOCIN/0.9 % SODIUM CHLORIDE 30/500 ML
999 PLASTIC BAG, INJECTION (ML) INTRAVENOUS ONCE
Status: COMPLETED | OUTPATIENT
Start: 2023-03-23 | End: 2023-03-24

## 2023-03-23 RX ORDER — FAMOTIDINE 20 MG/1
20 TABLET, FILM COATED ORAL 2 TIMES DAILY PRN
Status: DISCONTINUED | OUTPATIENT
Start: 2023-03-23 | End: 2023-03-24 | Stop reason: HOSPADM

## 2023-03-23 RX ORDER — EPHEDRINE SULFATE 50 MG/ML
5 INJECTION, SOLUTION INTRAVENOUS
Status: DISCONTINUED | OUTPATIENT
Start: 2023-03-23 | End: 2023-03-24 | Stop reason: HOSPADM

## 2023-03-23 RX ORDER — FENTANYL CIT 0.2 MG/100ML-ROPIV 0.2%-NACL 0.9% EPIDURAL INJ 2/0.2 MCG/ML-%
10 SOLUTION INJECTION CONTINUOUS
Status: DISCONTINUED | OUTPATIENT
Start: 2023-03-23 | End: 2023-03-24

## 2023-03-23 RX ORDER — OXYTOCIN/0.9 % SODIUM CHLORIDE 30/500 ML
2-20 PLASTIC BAG, INJECTION (ML) INTRAVENOUS
Status: DISCONTINUED | OUTPATIENT
Start: 2023-03-23 | End: 2023-03-24

## 2023-03-23 RX ORDER — TERBUTALINE SULFATE 1 MG/ML
0.25 INJECTION, SOLUTION SUBCUTANEOUS AS NEEDED
Status: DISCONTINUED | OUTPATIENT
Start: 2023-03-23 | End: 2023-03-24 | Stop reason: HOSPADM

## 2023-03-23 RX ORDER — LIDOCAINE HYDROCHLORIDE AND EPINEPHRINE 15; 5 MG/ML; UG/ML
INJECTION, SOLUTION EPIDURAL AS NEEDED
Status: DISCONTINUED | OUTPATIENT
Start: 2023-03-23 | End: 2023-03-24 | Stop reason: SURG

## 2023-03-23 RX ORDER — DIPHENHYDRAMINE HYDROCHLORIDE 50 MG/ML
12.5 INJECTION INTRAMUSCULAR; INTRAVENOUS EVERY 8 HOURS PRN
Status: DISCONTINUED | OUTPATIENT
Start: 2023-03-23 | End: 2023-03-24 | Stop reason: HOSPADM

## 2023-03-23 RX ADMIN — SODIUM CHLORIDE, POTASSIUM CHLORIDE, SODIUM LACTATE AND CALCIUM CHLORIDE 125 ML/HR: 600; 310; 30; 20 INJECTION, SOLUTION INTRAVENOUS at 09:57

## 2023-03-23 RX ADMIN — Medication 25 MCG: at 03:05

## 2023-03-23 RX ADMIN — SODIUM CHLORIDE, POTASSIUM CHLORIDE, SODIUM LACTATE AND CALCIUM CHLORIDE 125 ML/HR: 600; 310; 30; 20 INJECTION, SOLUTION INTRAVENOUS at 18:23

## 2023-03-23 RX ADMIN — SODIUM CHLORIDE, POTASSIUM CHLORIDE, SODIUM LACTATE AND CALCIUM CHLORIDE 125 ML/HR: 600; 310; 30; 20 INJECTION, SOLUTION INTRAVENOUS at 02:50

## 2023-03-23 RX ADMIN — Medication 10 ML/HR: at 17:54

## 2023-03-23 RX ADMIN — ROPIVACAINE HYDROCHLORIDE 10 ML: 2 INJECTION, SOLUTION EPIDURAL; INFILTRATION at 17:51

## 2023-03-23 RX ADMIN — Medication 2 MILLI-UNITS/MIN: at 12:39

## 2023-03-23 RX ADMIN — Medication 25 MCG: at 08:16

## 2023-03-23 RX ADMIN — SODIUM CHLORIDE, POTASSIUM CHLORIDE, SODIUM LACTATE AND CALCIUM CHLORIDE 999 ML/HR: 600; 310; 30; 20 INJECTION, SOLUTION INTRAVENOUS at 17:23

## 2023-03-23 RX ADMIN — ACETAMINOPHEN 650 MG: 325 TABLET ORAL at 20:32

## 2023-03-23 RX ADMIN — LIDOCAINE HYDROCHLORIDE AND EPINEPHRINE 3 ML: 15; 5 INJECTION, SOLUTION EPIDURAL at 17:46

## 2023-03-23 NOTE — PLAN OF CARE
Problem: Adult Inpatient Plan of Care  Goal: Plan of Care Review  Outcome: Ongoing, Progressing  Flowsheets (Taken 3/23/2023 0736)  Outcome Evaluation: Pt admitted for scheduled induction d/t Gest.HTN.  Pain 0/10, +FM, reports no LOF.  Goal: Patient-Specific Goal (Individualized)  Outcome: Ongoing, Progressing  Goal: Absence of Hospital-Acquired Illness or Injury  Outcome: Ongoing, Progressing  Intervention: Prevent and Manage VTE (Venous Thromboembolism) Risk  Recent Flowsheet Documentation  Taken 3/23/2023 0300 by Keysha Burger, RN  VTE Prevention/Management:   bilateral   sequential compression devices on  Goal: Optimal Comfort and Wellbeing  Outcome: Ongoing, Progressing  Goal: Readiness for Transition of Care  Outcome: Ongoing, Progressing  Intervention: Mutually Develop Transition Plan  Recent Flowsheet Documentation  Taken 3/23/2023 0447 by Keysha Burger, RN  Equipment Currently Used at Home: none     Problem: Bleeding (Labor)  Goal: Hemostasis  Outcome: Ongoing, Progressing     Problem: Change in Fetal Wellbeing (Labor)  Goal: Stable Fetal Wellbeing  Outcome: Ongoing, Progressing     Problem: Delayed Labor Progression (Labor)  Goal: Effective Progression to Delivery  Outcome: Ongoing, Progressing     Problem: Infection (Labor)  Goal: Absence of Infection Signs and Symptoms  Outcome: Ongoing, Progressing     Problem: Labor Pain (Labor)  Goal: Acceptable Pain Control  Outcome: Ongoing, Progressing     Problem: Uterine Tachysystole (Labor)  Goal: Normal Uterine Contraction Pattern  Outcome: Ongoing, Progressing   Goal Outcome Evaluation:              Outcome Evaluation: Pt admitted for scheduled induction d/t Gest.HTN.  Pain 0/10, +FM, reports no LOF.

## 2023-03-23 NOTE — INTERVAL H&P NOTE
H&P reviewed. The patient was examined and there are no changes to the H&P.  Fetal heart tones reassuring.  Patient has received 2 doses of Cytotec.

## 2023-03-23 NOTE — ANESTHESIA PREPROCEDURE EVALUATION
Anesthesia Evaluation                  Airway   Mallampati: II  TM distance: >3 FB  Neck ROM: full  Dental - normal exam     Pulmonary - normal exam   Cardiovascular - normal exam    (+) hypertension,       Neuro/Psych  GI/Hepatic/Renal/Endo    (+)   diabetes mellitus gestational,     Musculoskeletal     Abdominal    Substance History      OB/GYN    (+) Pregnant, pregnancy induced hypertension        Other                        Anesthesia Plan    ASA 3     epidural       Anesthetic plan, risks, benefits, and alternatives have been provided, discussed and informed consent has been obtained with: patient.        CODE STATUS:    Level Of Support Discussed With: Patient  Code Status (Patient has no pulse and is not breathing): CPR (Attempt to Resuscitate)  Medical Interventions (Patient has pulse or is breathing): Full Support  Release to patient: Routine Release

## 2023-03-23 NOTE — ANESTHESIA PROCEDURE NOTES
Labor Epidural      Patient location during procedure: OB  Performed By  Anesthesiologist: Geoffrey Sumner MD  Preanesthetic Checklist  Completed: patient identified, risks and benefits discussed, pre-op evaluation and timeout performed  Additional Notes  38w0d gestation  Prep:  Pt Position:sitting  Sterile Tech:cap, gloves, mask and sterile barrier  Prep:chlorhexidine gluconate and isopropyl alcohol  Monitoring:blood pressure monitoring, continuous pulse oximetry and EKG  Epidural Block Procedure:  Approach:midline  Guidance:landmark technique  Location:L3-L4  Needle Type:Tuohy  Needle Gauge:17  Loss of Resistance Medium: air  Loss of Resistance: 6cm  Cath Depth at skin:11 cm  Paresthesia: none  Aspiration:negative  Test Dose:negative  Number of Attempts: 1  Post Assessment:  Dressing:occlusive dressing applied and secured with tape  Pt Tolerance:patient tolerated the procedure well with no apparent complications  Complications:no

## 2023-03-23 NOTE — PLAN OF CARE
Problem: Adult Inpatient Plan of Care  Goal: Plan of Care Review  Outcome: Ongoing, Progressing  Flowsheets (Taken 3/23/2023 1845)  Progress: improving  Plan of Care Reviewed With: patient  Outcome Evaluation:   Pt in LDR1 is alert and oriented with VS WNL. IUPC and EFM tracing. Pt resting comfortably with epidural. Call light within reach   SCDs on bilateral lower extremities. Plan for .  Goal: Patient-Specific Goal (Individualized)  Outcome: Ongoing, Progressing  Goal: Absence of Hospital-Acquired Illness or Injury  Outcome: Ongoing, Progressing  Intervention: Identify and Manage Fall Risk  Recent Flowsheet Documentation  Taken 3/23/2023 1204 by Fannie Leahy RN  Safety Promotion/Fall Prevention: safety round/check completed  Taken 3/23/2023 0801 by Fannie Leahy RN  Safety Promotion/Fall Prevention: safety round/check completed  Goal: Optimal Comfort and Wellbeing  Outcome: Ongoing, Progressing  Intervention: Monitor Pain and Promote Comfort  Recent Flowsheet Documentation  Taken 3/23/2023 0801 by Fannie Leahy RN  Pain Management Interventions:   position adjusted   pillow support provided  Intervention: Provide Person-Centered Care  Recent Flowsheet Documentation  Taken 3/23/2023 0801 by Fannie Leahy RN  Trust Relationship/Rapport:   care explained   emotional support provided   questions answered   questions encouraged  Goal: Readiness for Transition of Care  Outcome: Ongoing, Progressing     Problem: Bleeding (Labor)  Goal: Hemostasis  Outcome: Ongoing, Progressing     Problem: Change in Fetal Wellbeing (Labor)  Goal: Stable Fetal Wellbeing  Outcome: Ongoing, Progressing     Problem: Delayed Labor Progression (Labor)  Goal: Effective Progression to Delivery  Outcome: Ongoing, Progressing     Problem: Infection (Labor)  Goal: Absence of Infection Signs and Symptoms  Outcome: Ongoing, Progressing     Problem: Labor Pain (Labor)  Goal: Acceptable Pain Control  Outcome: Ongoing,  Progressing     Problem: Uterine Tachysystole (Labor)  Goal: Normal Uterine Contraction Pattern  Outcome: Ongoing, Progressing   Goal Outcome Evaluation:  Plan of Care Reviewed With: patient        Progress: improving  Outcome Evaluation: Pt in LDR1 is alert and oriented with VS WNL. IUPC and EFM tracing. Pt resting comfortably with epidural. Call light within reach; SCDs on bilateral lower extremities. Plan for .

## 2023-03-24 PROCEDURE — 25010000002 CEFAZOLIN IN DEXTROSE 2-4 GM/100ML-% SOLUTION: Performed by: OBSTETRICS & GYNECOLOGY

## 2023-03-24 PROCEDURE — 88307 TISSUE EXAM BY PATHOLOGIST: CPT

## 2023-03-24 PROCEDURE — 59400 OBSTETRICAL CARE: CPT | Performed by: OBSTETRICS & GYNECOLOGY

## 2023-03-24 PROCEDURE — 3E033VJ INTRODUCTION OF OTHER HORMONE INTO PERIPHERAL VEIN, PERCUTANEOUS APPROACH: ICD-10-PCS | Performed by: OBSTETRICS & GYNECOLOGY

## 2023-03-24 PROCEDURE — 0DQR0ZZ REPAIR ANAL SPHINCTER, OPEN APPROACH: ICD-10-PCS | Performed by: OBSTETRICS & GYNECOLOGY

## 2023-03-24 RX ORDER — ONDANSETRON 2 MG/ML
4 INJECTION INTRAMUSCULAR; INTRAVENOUS EVERY 6 HOURS PRN
Status: DISCONTINUED | OUTPATIENT
Start: 2023-03-24 | End: 2023-03-27 | Stop reason: HOSPADM

## 2023-03-24 RX ORDER — DIPHENHYDRAMINE HCL 25 MG
25 CAPSULE ORAL NIGHTLY PRN
Status: DISCONTINUED | OUTPATIENT
Start: 2023-03-24 | End: 2023-03-27 | Stop reason: HOSPADM

## 2023-03-24 RX ORDER — HYDROCODONE BITARTRATE AND ACETAMINOPHEN 10; 325 MG/1; MG/1
1 TABLET ORAL EVERY 4 HOURS PRN
Status: DISCONTINUED | OUTPATIENT
Start: 2023-03-24 | End: 2023-03-27 | Stop reason: HOSPADM

## 2023-03-24 RX ORDER — IBUPROFEN 600 MG/1
600 TABLET ORAL EVERY 6 HOURS PRN
Status: DISCONTINUED | OUTPATIENT
Start: 2023-03-24 | End: 2023-03-27 | Stop reason: HOSPADM

## 2023-03-24 RX ORDER — TRANEXAMIC ACID 10 MG/ML
1000 INJECTION, SOLUTION INTRAVENOUS ONCE AS NEEDED
Status: DISCONTINUED | OUTPATIENT
Start: 2023-03-24 | End: 2023-03-24

## 2023-03-24 RX ORDER — SODIUM CHLORIDE 0.9 % (FLUSH) 0.9 %
1-10 SYRINGE (ML) INJECTION AS NEEDED
Status: DISCONTINUED | OUTPATIENT
Start: 2023-03-24 | End: 2023-03-27 | Stop reason: HOSPADM

## 2023-03-24 RX ORDER — ONDANSETRON 4 MG/1
4 TABLET, FILM COATED ORAL EVERY 8 HOURS PRN
Status: DISCONTINUED | OUTPATIENT
Start: 2023-03-24 | End: 2023-03-27 | Stop reason: HOSPADM

## 2023-03-24 RX ORDER — DOCUSATE SODIUM 100 MG/1
100 CAPSULE, LIQUID FILLED ORAL 2 TIMES DAILY
Status: DISCONTINUED | OUTPATIENT
Start: 2023-03-24 | End: 2023-03-27 | Stop reason: HOSPADM

## 2023-03-24 RX ORDER — PHYTONADIONE 1 MG/.5ML
INJECTION, EMULSION INTRAMUSCULAR; INTRAVENOUS; SUBCUTANEOUS
Status: ACTIVE
Start: 2023-03-24 | End: 2023-03-24

## 2023-03-24 RX ORDER — HYDROCORTISONE 25 MG/G
1 CREAM TOPICAL AS NEEDED
Status: DISCONTINUED | OUTPATIENT
Start: 2023-03-24 | End: 2023-03-27 | Stop reason: HOSPADM

## 2023-03-24 RX ORDER — CEFAZOLIN SODIUM 2 G/100ML
2 INJECTION, SOLUTION INTRAVENOUS ONCE
Status: COMPLETED | OUTPATIENT
Start: 2023-03-24 | End: 2023-03-24

## 2023-03-24 RX ORDER — PRENATAL VIT/IRON FUM/FOLIC AC 27MG-0.8MG
1 TABLET ORAL DAILY
Status: DISCONTINUED | OUTPATIENT
Start: 2023-03-24 | End: 2023-03-27 | Stop reason: HOSPADM

## 2023-03-24 RX ORDER — ERYTHROMYCIN 5 MG/G
OINTMENT OPHTHALMIC
Status: ACTIVE
Start: 2023-03-24 | End: 2023-03-24

## 2023-03-24 RX ORDER — ACETAMINOPHEN 325 MG/1
650 TABLET ORAL EVERY 6 HOURS PRN
Status: DISCONTINUED | OUTPATIENT
Start: 2023-03-24 | End: 2023-03-27 | Stop reason: HOSPADM

## 2023-03-24 RX ORDER — HYDROCODONE BITARTRATE AND ACETAMINOPHEN 5; 325 MG/1; MG/1
1 TABLET ORAL EVERY 4 HOURS PRN
Status: DISCONTINUED | OUTPATIENT
Start: 2023-03-24 | End: 2023-03-27 | Stop reason: HOSPADM

## 2023-03-24 RX ADMIN — ACETAMINOPHEN 650 MG: 325 TABLET, FILM COATED ORAL at 15:03

## 2023-03-24 RX ADMIN — ACETAMINOPHEN 650 MG: 325 TABLET ORAL at 03:24

## 2023-03-24 RX ADMIN — IBUPROFEN 600 MG: 600 TABLET, FILM COATED ORAL at 09:28

## 2023-03-24 RX ADMIN — MINERAL OIL 30 ML: 1000 SOLUTION ORAL at 03:24

## 2023-03-24 RX ADMIN — IBUPROFEN 600 MG: 600 TABLET, FILM COATED ORAL at 20:50

## 2023-03-24 RX ADMIN — DOCUSATE SODIUM 100 MG: 100 CAPSULE, LIQUID FILLED ORAL at 09:21

## 2023-03-24 RX ADMIN — ACETAMINOPHEN 650 MG: 325 TABLET, FILM COATED ORAL at 09:28

## 2023-03-24 RX ADMIN — IBUPROFEN 600 MG: 600 TABLET, FILM COATED ORAL at 03:24

## 2023-03-24 RX ADMIN — Medication 1 APPLICATION: at 09:28

## 2023-03-24 RX ADMIN — CEFAZOLIN SODIUM 2 G: 2 INJECTION, SOLUTION INTRAVENOUS at 03:06

## 2023-03-24 RX ADMIN — Medication 999 ML/HR: at 00:56

## 2023-03-24 RX ADMIN — IBUPROFEN 600 MG: 600 TABLET, FILM COATED ORAL at 15:03

## 2023-03-24 RX ADMIN — DOCUSATE SODIUM 100 MG: 100 CAPSULE, LIQUID FILLED ORAL at 20:50

## 2023-03-24 RX ADMIN — Medication: at 06:13

## 2023-03-24 RX ADMIN — Medication 1 TABLET: at 09:20

## 2023-03-24 RX ADMIN — Medication 250 ML/HR: at 01:48

## 2023-03-24 NOTE — PLAN OF CARE
Problem: Adult Inpatient Plan of Care  Goal: Absence of Hospital-Acquired Illness or Injury  Intervention: Identify and Manage Fall Risk  Description: Perform standard risk assessment on admission using a validated tool or comprehensive approach appropriate to the patient; reassess fall risk frequently, with change in status or transfer to another level of care.  Communicate fall injury risk to interprofessional healthcare team.  Determine need for increased observation, equipment and environmental modification, such as low bed, signage and supportive, nonskid footwear.  Adjust safety measures to individual developmental age, stage and identified risk factors.  Reinforce the importance of safety and physical activity with patient and family.  Perform regular intentional rounding to assess need for position change, pain assessment and personal needs, including assistance with toileting.  Recent Flowsheet Documentation  Taken 3/24/2023 0420 by Rosalba Alonso RN  Safety Promotion/Fall Prevention: safety round/check completed  Taken 3/24/2023 0350 by Rosalba Alonso RN  Safety Promotion/Fall Prevention: safety round/check completed  Intervention: Prevent and Manage VTE (Venous Thromboembolism) Risk  Description: Assess for VTE (venous thromboembolism) risk.  Encourage and assist with early ambulation.  Initiate and maintain compression or other therapy, as indicated, based on identified risk in accordance with organizational protocol and provider order.  Encourage both active and passive leg exercises while in bed, if unable to ambulate.  Recent Flowsheet Documentation  Taken 3/24/2023 0420 by Rosalba Alonso RN  Activity Management: activity adjusted per tolerance  Goal: Optimal Comfort and Wellbeing  Intervention: Monitor Pain and Promote Comfort  Description: Assess pain level, treatment efficacy and patient response at regular intervals using a consistent pain  scale.  Consider the presence and impact of preexisting chronic pain.  Encourage patient and caregiver involvement in pain assessment, interventions and safety measures.  Recent Flowsheet Documentation  Taken 3/24/2023 0420 by Rosalba Alonso RN  Pain Management Interventions:   pain management plan reviewed with patient/caregiver   relaxation techniques promoted  Taken 3/24/2023 0350 by Rosalba Alonso RN  Pain Management Interventions: medication offered but refused  Intervention: Provide Person-Centered Care  Description: Use a family-focused approach to care.  Develop trust and rapport by proactively providing information, encouraging questions, addressing concerns and offering reassurance.  Acknowledge emotional response to hospitalization.  Recognize and utilize personal coping strategies.  Honor spiritual and cultural preferences.  Recent Flowsheet Documentation  Taken 3/24/2023 0420 by Rosalba Alonso RN  Trust Relationship/Rapport:   care explained   thoughts/feelings acknowledged  Taken 3/24/2023 0350 by Rosalba Alonso RN  Trust Relationship/Rapport: care explained   Goal Outcome Evaluation:

## 2023-03-24 NOTE — LACTATION NOTE
LC follow-up. Patient discouraged because baby will n ot wake to feed. She is using the manual pump but not getting colostrum. LC practiced breast massage and hand expression and drops of colostrum were placed on baby's lips and he was placed back in S2S. Lactation Consult Note    Evaluation Completed: 3/24/2023 13:03 EDT  Patient Name: Ann Pyle  :  1990  MRN:  [id number]     REFERRAL  INFORMATION:                          Date of Referral: 23   Person Making Referral: lactation consultant  Maternal Reason for Referral: breastfeeding currently, no prior breastfeeding experience  Infant Reason for Referral: regurgitation, sleepy    DELIVERY HISTORY:        Skin to skin initiation date/time: 3/24/2023  12:55 AM   Skin to skin end date/time: 3/24/2023  2:30 AM        MATERNAL ASSESSMENT:     Breast Shape: pendulous, round (23 1300)  Breast Density: soft (23 1300)  Areola: elastic (23 1300)  Nipples: everted, graspable (23 1300)     Left Nipple Symptoms: nontender (23 1300)  Right Nipple Symptoms: intact, nontender (23 1300)       INFANT ASSESSMENT:  Information for the patient's :  Jacoby Pyle [[id number]]   No past medical history on file.                                                                                                     MATERNAL INFANT FEEDING:     Maternal Emotional State: relaxed (23 1300)                                                                 EQUIPMENT TYPE:  Breast Pump Type: manual pump (23 1300)  Breast Pump Flange Type: hard (23 1300)  Breast Pump Flange Size: 24 mm (23 1300)                        BREAST PUMPING:  Breast Pumping Interventions: early pumping promoted (23 0800)       LACTATION REFERRALS:

## 2023-03-24 NOTE — L&D DELIVERY NOTE
Delivery Note    Obstetrician:   Chris Rocha MD    Pre-Delivery Diagnosis: 38 weeks 1 day gestation with gestational hypertension and late onset diet-controlled gestational diabetes    Post-Delivery Diagnosis: Same    Procedure: Spontaneous vaginal delivery    Patient is a 32-year-old G1, P0 female initially admitted at 37 weeks 6 days gestation for Cytotec ripening followed by Pitocin induction of labor due to gestational hypertension with persistent headache and late onset diet-controlled gestational diabetes.  GBS negative.  Patient had an amniotomy at 1-1/2 cm with clear fluid and an IUPC placed.  She had epidural anesthesia and while on Pitocin began progressing rapidly to complete dilatation and pushed easily to +3 station.  She underwent a spontaneous sterile controlled vaginal delivery of a live viable male infant weighing approximately 7 pounds with Apgars of 8 and 9.  There was a second-degree midline episiotomy cut but there was a partial third-degree extension.  After 30 seconds of delayed cord clamping, the cord was doubly clamped and cut and the infant suctioned to clear fluid and handed to the mother in excellent condition.  Cord blood was obtained and the placenta was delivered spontaneously intact with 3 vessels and uterus wiped clean.  The episiotomy was repaired with 2-0 Vicryl suture on the sphincter muscles and 3-0 Vicryl closing the remainder of the episiotomy in a routine fashion.  The rectum was checked several times throughout the procedure including at the end with no evidence of any injury to it.  A straight cath was placed in the bladder at the end of the procedure with clear fluid noted.  Patient had an estimated blood loss of 300 cc with no complications and all counts correct and both mother and infant did well immediately postpartum.  Patient was to receive 1 g of Kefzol IV prophylaxis due to the partial third-degree episiotomy extension.    Episiotomy or Incision: Partial  third-degree episiotomy extension    Indications for instrumental delivery: none    Infant Wt:    Approximately 7 pounds.      Male infant    Apgars: 1' 8  /  5' 9     Placenta and Cord:          Mechanism: spontaneous        Description:  3 vessel cord    Estimated Blood Loss:  300 mL                             Complications:  None           Condition: Stable    Blood Type and Rh: AB+      Rubella Immunity Status: Immune          Infant Feeding:    breast    Attending Attestation: I was present and scrubbed for the entire procedure.      3/24/2023  Chris Rocha MD

## 2023-03-24 NOTE — LACTATION NOTE
P1T. Baby boy has been spitty for mom after nursing 30 mins in L&D . He is sleepy and S2S was encouraged along with breast massage and hand expression. A HP was supplied . 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 Very supportive and helpful.  LC # on WB.  Lactation Consult Note    Evaluation Completed: 3/24/2023 08:57 EDT  Patient Name: Ann Pyle  :  1990  MRN:  [id number]     REFERRAL  INFORMATION:                          Date of Referral: 23   Person Making Referral: lactation consultant  Maternal Reason for Referral: breastfeeding currently, no prior breastfeeding experience  Infant Reason for Referral: sleepy, regurgitation    DELIVERY HISTORY:        Skin to skin initiation date/time: 3/24/2023  12:55 AM   Skin to skin end date/time: 3/24/2023  2:30 AM        MATERNAL ASSESSMENT:     Breast Shape: round, pendulous (23 08)  Breast Density: soft (23)  Areola: elastic (23)  Nipples: graspable (23)     Left Nipple Symptoms: nontender (23)  Right Nipple Symptoms: intact, nontender (23)       INFANT ASSESSMENT:  Information for the patient's :  Jacoby Pyle [[id number]]   No past medical history on file.                                                                                                     MATERNAL INFANT FEEDING:     Maternal Emotional State: receptive (23 08)                                                                 EQUIPMENT TYPE:  Breast Pump Type: manual pump (23)  Breast Pump Flange Type: hard (23)  Breast Pump Flange Size: 27 mm, 24 mm (23)                        BREAST PUMPING:  Breast Pumping Interventions: early pumping promoted (23)       LACTATION REFERRALS:

## 2023-03-24 NOTE — LACTATION NOTE
Patient wanting assistance with latching baby. Assisted pt with waking and latching baby. Baby is latching and BF well at this time. Baby has strong sucks. Baby BF for 15 min on left breast and 7 min on right breast. Reviewed hand pump with patient she was able to express drops of colostrum that was given to baby. Encouraged to BF at least every 2-3 hours for 10-15 min on each breasts. Call LC as needed. Educated on importance of deep latching and ways to achieve it. Encouraged pt to review provided booklet on BF.    Lactation Consult Note    Evaluation Completed: 3/24/2023 14:49 EDT  Patient Name: Ann Pyle  :  1990  MRN:  [id number]     REFERRAL  INFORMATION:                          Date of Referral: 23   Person Making Referral: lactation consultant  Maternal Reason for Referral: breastfeeding currently, no prior breastfeeding experience  Infant Reason for Referral: regurgitation, sleepy    DELIVERY HISTORY:        Skin to skin initiation date/time: 3/24/2023  12:55 AM   Skin to skin end date/time: 3/24/2023  2:30 AM        MATERNAL ASSESSMENT:                               INFANT ASSESSMENT:  Information for the patient's :  Jacoby Pyle [[id number]]   No past medical history on file.                                                                                                     MATERNAL INFANT FEEDING:                                                                       EQUIPMENT TYPE:                                 BREAST PUMPING:          LACTATION REFERRALS:

## 2023-03-24 NOTE — PROGRESS NOTES
Patient initially had amniotomy at 5:15 PM with clear fluid and IUPC placed.  At that time 1-1/2 cm dilated.  Patient has progressed rapidly since then and IUPC stopped working so was just replaced with cervix completely effaced/7 to 8 cm dilated/-1 station.  Patient had a deceleration on her back but that has resolved and fetal heart tones are currently reassuring.

## 2023-03-24 NOTE — PLAN OF CARE
Goal Outcome Evaluation:    of viable male over 3rd degree episiotomy with laceration extension repaired

## 2023-03-25 LAB
ALBUMIN SERPL-MCNC: 3.1 G/DL (ref 3.5–5.2)
ALBUMIN/GLOB SERPL: 1.3 G/DL
ALP SERPL-CCNC: 121 U/L (ref 39–117)
ALT SERPL W P-5'-P-CCNC: 12 U/L (ref 1–33)
ANION GAP SERPL CALCULATED.3IONS-SCNC: 9 MMOL/L (ref 5–15)
AST SERPL-CCNC: 14 U/L (ref 1–32)
BASOPHILS # BLD AUTO: 0.03 10*3/MM3 (ref 0–0.2)
BASOPHILS # BLD AUTO: 0.03 10*3/MM3 (ref 0–0.2)
BASOPHILS NFR BLD AUTO: 0.3 % (ref 0–1.5)
BASOPHILS NFR BLD AUTO: 0.3 % (ref 0–1.5)
BILIRUB SERPL-MCNC: <0.2 MG/DL (ref 0–1.2)
BUN SERPL-MCNC: 9 MG/DL (ref 6–20)
BUN/CREAT SERPL: 16.4 (ref 7–25)
CALCIUM SPEC-SCNC: 8.5 MG/DL (ref 8.6–10.5)
CHLORIDE SERPL-SCNC: 109 MMOL/L (ref 98–107)
CO2 SERPL-SCNC: 23 MMOL/L (ref 22–29)
CREAT SERPL-MCNC: 0.55 MG/DL (ref 0.57–1)
DEPRECATED RDW RBC AUTO: 46 FL (ref 37–54)
DEPRECATED RDW RBC AUTO: 46.6 FL (ref 37–54)
EGFRCR SERPLBLD CKD-EPI 2021: 123.5 ML/MIN/1.73
EOSINOPHIL # BLD AUTO: 0.12 10*3/MM3 (ref 0–0.4)
EOSINOPHIL # BLD AUTO: 0.16 10*3/MM3 (ref 0–0.4)
EOSINOPHIL NFR BLD AUTO: 1 % (ref 0.3–6.2)
EOSINOPHIL NFR BLD AUTO: 1.5 % (ref 0.3–6.2)
ERYTHROCYTE [DISTWIDTH] IN BLOOD BY AUTOMATED COUNT: 14.4 % (ref 12.3–15.4)
ERYTHROCYTE [DISTWIDTH] IN BLOOD BY AUTOMATED COUNT: 14.8 % (ref 12.3–15.4)
GLOBULIN UR ELPH-MCNC: 2.4 GM/DL
GLUCOSE SERPL-MCNC: 80 MG/DL (ref 65–99)
HCT VFR BLD AUTO: 30.9 % (ref 34–46.6)
HCT VFR BLD AUTO: 30.9 % (ref 34–46.6)
HGB BLD-MCNC: 10.1 G/DL (ref 12–15.9)
HGB BLD-MCNC: 10.2 G/DL (ref 12–15.9)
IMM GRANULOCYTES # BLD AUTO: 0.06 10*3/MM3 (ref 0–0.05)
IMM GRANULOCYTES # BLD AUTO: 0.13 10*3/MM3 (ref 0–0.05)
IMM GRANULOCYTES NFR BLD AUTO: 0.6 % (ref 0–0.5)
IMM GRANULOCYTES NFR BLD AUTO: 1.1 % (ref 0–0.5)
LYMPHOCYTES # BLD AUTO: 2.78 10*3/MM3 (ref 0.7–3.1)
LYMPHOCYTES # BLD AUTO: 3.18 10*3/MM3 (ref 0.7–3.1)
LYMPHOCYTES NFR BLD AUTO: 26.4 % (ref 19.6–45.3)
LYMPHOCYTES NFR BLD AUTO: 26.7 % (ref 19.6–45.3)
MCH RBC QN AUTO: 28.4 PG (ref 26.6–33)
MCH RBC QN AUTO: 28.5 PG (ref 26.6–33)
MCHC RBC AUTO-ENTMCNC: 32.7 G/DL (ref 31.5–35.7)
MCHC RBC AUTO-ENTMCNC: 33 G/DL (ref 31.5–35.7)
MCV RBC AUTO: 86.3 FL (ref 79–97)
MCV RBC AUTO: 86.8 FL (ref 79–97)
MONOCYTES # BLD AUTO: 0.62 10*3/MM3 (ref 0.1–0.9)
MONOCYTES # BLD AUTO: 0.76 10*3/MM3 (ref 0.1–0.9)
MONOCYTES NFR BLD AUTO: 5.9 % (ref 5–12)
MONOCYTES NFR BLD AUTO: 6.4 % (ref 5–12)
NEUTROPHILS NFR BLD AUTO: 6.88 10*3/MM3 (ref 1.7–7)
NEUTROPHILS NFR BLD AUTO: 64.5 % (ref 42.7–76)
NEUTROPHILS NFR BLD AUTO: 65.3 % (ref 42.7–76)
NEUTROPHILS NFR BLD AUTO: 7.68 10*3/MM3 (ref 1.7–7)
NRBC BLD AUTO-RTO: 0 /100 WBC (ref 0–0.2)
NRBC BLD AUTO-RTO: 0 /100 WBC (ref 0–0.2)
PLATELET # BLD AUTO: 164 10*3/MM3 (ref 140–450)
PLATELET # BLD AUTO: 184 10*3/MM3 (ref 140–450)
PMV BLD AUTO: 10.1 FL (ref 6–12)
PMV BLD AUTO: 10.5 FL (ref 6–12)
POTASSIUM SERPL-SCNC: 3.8 MMOL/L (ref 3.5–5.2)
PROT SERPL-MCNC: 5.5 G/DL (ref 6–8.5)
QT INTERVAL: 330 MS
RBC # BLD AUTO: 3.56 10*6/MM3 (ref 3.77–5.28)
RBC # BLD AUTO: 3.58 10*6/MM3 (ref 3.77–5.28)
SODIUM SERPL-SCNC: 141 MMOL/L (ref 136–145)
WBC NRBC COR # BLD: 10.53 10*3/MM3 (ref 3.4–10.8)
WBC NRBC COR # BLD: 11.9 10*3/MM3 (ref 3.4–10.8)

## 2023-03-25 PROCEDURE — 0503F POSTPARTUM CARE VISIT: CPT | Performed by: OBSTETRICS & GYNECOLOGY

## 2023-03-25 PROCEDURE — 93005 ELECTROCARDIOGRAM TRACING: CPT | Performed by: OBSTETRICS & GYNECOLOGY

## 2023-03-25 PROCEDURE — 80053 COMPREHEN METABOLIC PANEL: CPT | Performed by: OBSTETRICS & GYNECOLOGY

## 2023-03-25 PROCEDURE — 85025 COMPLETE CBC W/AUTO DIFF WBC: CPT | Performed by: OBSTETRICS & GYNECOLOGY

## 2023-03-25 PROCEDURE — 93010 ELECTROCARDIOGRAM REPORT: CPT | Performed by: INTERNAL MEDICINE

## 2023-03-25 RX ORDER — LABETALOL HYDROCHLORIDE 5 MG/ML
20-80 INJECTION, SOLUTION INTRAVENOUS
Status: DISCONTINUED | OUTPATIENT
Start: 2023-03-25 | End: 2023-03-27 | Stop reason: HOSPADM

## 2023-03-25 RX ORDER — LABETALOL 200 MG/1
200 TABLET, FILM COATED ORAL EVERY 12 HOURS SCHEDULED
Status: DISCONTINUED | OUTPATIENT
Start: 2023-03-25 | End: 2023-03-26

## 2023-03-25 RX ORDER — SODIUM CHLORIDE 0.9 % (FLUSH) 0.9 %
10 SYRINGE (ML) INJECTION EVERY 12 HOURS SCHEDULED
Status: DISCONTINUED | OUTPATIENT
Start: 2023-03-25 | End: 2023-03-27 | Stop reason: HOSPADM

## 2023-03-25 RX ORDER — HYDRALAZINE HYDROCHLORIDE 20 MG/ML
5-10 INJECTION INTRAMUSCULAR; INTRAVENOUS
Status: DISCONTINUED | OUTPATIENT
Start: 2023-03-25 | End: 2023-03-27 | Stop reason: HOSPADM

## 2023-03-25 RX ORDER — NIFEDIPINE 10 MG/1
10-20 CAPSULE ORAL
Status: DISCONTINUED | OUTPATIENT
Start: 2023-03-25 | End: 2023-03-27 | Stop reason: HOSPADM

## 2023-03-25 RX ORDER — NIFEDIPINE 10 MG/1
10 CAPSULE ORAL ONCE
Status: COMPLETED | OUTPATIENT
Start: 2023-03-25 | End: 2023-03-25

## 2023-03-25 RX ORDER — SODIUM CHLORIDE 0.9 % (FLUSH) 0.9 %
10 SYRINGE (ML) INJECTION AS NEEDED
Status: DISCONTINUED | OUTPATIENT
Start: 2023-03-25 | End: 2023-03-27 | Stop reason: HOSPADM

## 2023-03-25 RX ADMIN — DOCUSATE SODIUM 100 MG: 100 CAPSULE, LIQUID FILLED ORAL at 08:47

## 2023-03-25 RX ADMIN — DOCUSATE SODIUM 100 MG: 100 CAPSULE, LIQUID FILLED ORAL at 20:21

## 2023-03-25 RX ADMIN — IBUPROFEN 600 MG: 600 TABLET, FILM COATED ORAL at 18:29

## 2023-03-25 RX ADMIN — IBUPROFEN 600 MG: 600 TABLET, FILM COATED ORAL at 12:35

## 2023-03-25 RX ADMIN — IBUPROFEN 600 MG: 600 TABLET, FILM COATED ORAL at 04:05

## 2023-03-25 RX ADMIN — LABETALOL HYDROCHLORIDE 200 MG: 200 TABLET, FILM COATED ORAL at 22:28

## 2023-03-25 RX ADMIN — Medication 1 TABLET: at 08:47

## 2023-03-25 RX ADMIN — NIFEDIPINE 10 MG: 10 CAPSULE ORAL at 22:28

## 2023-03-25 NOTE — PROGRESS NOTES
Assessment/Plan:  Status post Vaginal Delivery, PPD 1. Doing well postpatum.     Plan:  1.  Routine postpartum day #1 care.  Discussed perineal care.  Likely discharge home tomorrow.  2.  Patient and partner desire circumcision for baby. Risks, benefits, and alternatives discussed at length including the risks of pain, bleeding, infection, removing too much or too little foreskin, possible need for surgical revision in the future, injury to glans of the penis, injury to the urethra, scarring, abnormal sexual function, dissatisfaction with sexual function, and dissatisfaction with gender identity.   We discussed the options of delaying circumcision until the time in which the child would be able to provide consent on his own accord.  We discussed the options of not circumcising. Written educational materials on the indications for, risks, benefits, and alternatives to circumcision were provided to the patient prior to the procedure.     They verbalize understanding and wished to proceed with circumcision today. Circumcision performed without difficulty. See child's chart for details.         Rh: No results found for: ABORH   AB+  Rubella: Immune male  Infant:Male    Subjective:  Postpartum Day 1: Vaginal Delivery    The patient feels well.Pain is well controlled with current medications. Urinary output is adequate. The patient is ambulating well. The patient is tolerating a normal diet. Flatus has been passed.    Objective:  Vital signs in last 24 hours:  Temp:  [97.9 °F (36.6 °C)-98.6 °F (37 °C)] 98.6 °F (37 °C)  Heart Rate:  [] 83  Resp:  [18] 18  BP: (106-138)/(66-82) 106/66    I/O last 3 completed shifts:  In: -   Out: 600 [Blood:600]  No intake/output data recorded.          General:    alert, appears stated age and cooperative   Uterine Fundus:   firm   Abdomen:  Soft, nontender, nondistended   DVT Evaluation:  No evidence of DVT seen on physical exam.     Lab Results   Component Value Date    WBC 10.53  03/25/2023    HGB 10.1 (L) 03/25/2023    HCT 30.9 (L) 03/25/2023    MCV 86.8 03/25/2023     03/25/2023       Lab Results   Component Value Date    GLUCOSE 103 (H) 03/23/2023    BUN 13 03/23/2023    CREATININE 0.50 (L) 03/23/2023    EGFRRESULT 118.7 03/15/2023    EGFR 126.4 03/23/2023    BCR 26.0 (H) 03/23/2023    K 3.6 03/23/2023    CO2 21.0 (L) 03/23/2023    CALCIUM 8.5 (L) 03/23/2023    PROTENTOTREF 6.3 03/15/2023    ALBUMIN 3.2 (L) 03/23/2023    BILITOT 0.2 03/23/2023    AST 15 03/23/2023    ALT 14 03/23/2023

## 2023-03-25 NOTE — LACTATION NOTE
Lactation Consult Note    RN requesting LC to see patient.  Patient currently breastfeeding infant in football hold to left breast.  Infant double swaddled with shirt and hat on and falling asleep.  Educated patient on keeping baby skin to skin while breastfeeding.  LC undressed baby and relatched in football position to same breast.  Infant alert and pawing at mother's breast while breastfeeding.  Patient concerned about large breast size and baby breathing while feeding.  Reassured patient that nostrils could be visualized and positioning was so that baby can breathe while breastfeeding.  Educated patient on adequate output, attempting to feed every 2-3 hours, and breastfeeding positions.  Demonstrated hand expression and colostrum was easily obtained.  LC number on white board and encouraged to call with any questions.        Evaluation Completed: 3/25/2023 00:59 EDT  Patient Name: Ann Pyle  :  1990  MRN:  [id number]     REFERRAL  INFORMATION:                          Date of Referral: 23   Person Making Referral: nurse  Maternal Reason for Referral: breastfeeding currently  Infant Reason for Referral: sleepy    DELIVERY HISTORY:        Skin to skin initiation date/time: 3/24/2023  12:55 AM   Skin to skin end date/time: 3/24/2023  2:30 AM        MATERNAL ASSESSMENT:  Breast Size Issue: none (23)  Breast Shape: Bilateral:, pendulous (23)  Breast Density: Bilateral:, soft (23)  Areola: Bilateral:, elastic (23)  Nipples: Bilateral:, everted, graspable (23)                INFANT ASSESSMENT:  Information for the patient's :  Jacoby Pyle [[id number]]   No past medical history on file.           Feeding Tolerance/Success: arousal required (23)               Feeding Interventions: arousal required, latch assistance provided (23)               Breastfeeding: breastfeeding, left side only (23)    Infant Positioning: clutch/football (23)         Effective Latch During Feeding: yes (23)   Suck/Swallow/Breathing Coordination: present (23)   Signs of Milk Transfer: suck/swallow ratio, deep jaw excursions noted (23)       Latch: 2-->grasps breast, tongue down, lips flanged, rhythmic sucking (23)   Audible Swallowin-->none (23)   Type of Nipple: 2-->everted (after stimulation) (23)   Comfort (Breast/Nipple): 2-->soft/nontender (23)   Hold (Positioning): 1-->minimal assist, teach one side, mother does other, staff holds (23)   Latch Score: 7 (23)                    MATERNAL INFANT FEEDING:     Maternal Emotional State: receptive, relaxed (23)  Infant Positioning: clutch/football (23)   Signs of Milk Transfer: deep jaw excursions noted, suck/swallow ratio (23)  Pain with Feeding: no (23)        Comfort Measures Before/During Feeding: infant position adjusted, latch adjusted (23)  Milk Ejection Reflex: present (23)           Latch Assistance: minimal assistance (23)                               EQUIPMENT TYPE:                                 BREAST PUMPING:          LACTATION REFERRALS:

## 2023-03-25 NOTE — LACTATION NOTE
This note was copied from a baby's chart.  Informed PT that LC is here to help with BF today until 2000. Offered assistance but mother declined, said she will call later, when baby is due to BF if she needs help. Reports infant is latching well.PT denies any questions and concerns at this time.Encouraged to call LC if needing further assistance.

## 2023-03-25 NOTE — LACTATION NOTE
This note was copied from a baby's chart.  Patient called for latch assistance.  Patient having difficulty waking infant for feeding.  After several attempts to latch infant were unsuccessful, assisted patient with hand expression and using hand pump.  Patient able to express colostrum easily and several drops were placed on infant's lips.  Infant placed skin to skin with mother and began rooting.  Assisted patient in latching infant in football hold to left breast.  Demonstrated to patient ways to keep infant awake for feeding.  Patient very motivated to breastfeed and receptive to learning.  LC will follow as needed.

## 2023-03-26 ENCOUNTER — APPOINTMENT (OUTPATIENT)
Dept: GENERAL RADIOLOGY | Facility: HOSPITAL | Age: 33
End: 2023-03-26
Payer: COMMERCIAL

## 2023-03-26 PROCEDURE — 25010000002 ENOXAPARIN PER 10 MG: Performed by: OBSTETRICS & GYNECOLOGY

## 2023-03-26 PROCEDURE — 71046 X-RAY EXAM CHEST 2 VIEWS: CPT

## 2023-03-26 PROCEDURE — 0503F POSTPARTUM CARE VISIT: CPT | Performed by: OBSTETRICS & GYNECOLOGY

## 2023-03-26 PROCEDURE — 25010000002 FUROSEMIDE PER 20 MG: Performed by: OBSTETRICS & GYNECOLOGY

## 2023-03-26 RX ORDER — LABETALOL 200 MG/1
200 TABLET, FILM COATED ORAL EVERY 8 HOURS SCHEDULED
Status: DISCONTINUED | OUTPATIENT
Start: 2023-03-26 | End: 2023-03-27 | Stop reason: HOSPADM

## 2023-03-26 RX ORDER — FUROSEMIDE 10 MG/ML
20 INJECTION INTRAMUSCULAR; INTRAVENOUS ONCE
Status: COMPLETED | OUTPATIENT
Start: 2023-03-26 | End: 2023-03-26

## 2023-03-26 RX ORDER — ENOXAPARIN SODIUM 100 MG/ML
40 INJECTION SUBCUTANEOUS DAILY
Status: DISCONTINUED | OUTPATIENT
Start: 2023-03-26 | End: 2023-03-27 | Stop reason: HOSPADM

## 2023-03-26 RX ORDER — LABETALOL 200 MG/1
200 TABLET, FILM COATED ORAL EVERY 8 HOURS SCHEDULED
Status: DISCONTINUED | OUTPATIENT
Start: 2023-03-26 | End: 2023-03-26

## 2023-03-26 RX ADMIN — DOCUSATE SODIUM 100 MG: 100 CAPSULE, LIQUID FILLED ORAL at 20:30

## 2023-03-26 RX ADMIN — LABETALOL HYDROCHLORIDE 200 MG: 200 TABLET, FILM COATED ORAL at 08:43

## 2023-03-26 RX ADMIN — LABETALOL HYDROCHLORIDE 200 MG: 200 TABLET, FILM COATED ORAL at 16:28

## 2023-03-26 RX ADMIN — IBUPROFEN 600 MG: 600 TABLET, FILM COATED ORAL at 00:28

## 2023-03-26 RX ADMIN — Medication 1 TABLET: at 08:43

## 2023-03-26 RX ADMIN — IBUPROFEN 600 MG: 600 TABLET, FILM COATED ORAL at 16:29

## 2023-03-26 RX ADMIN — FUROSEMIDE 20 MG: 10 INJECTION, SOLUTION INTRAMUSCULAR; INTRAVENOUS at 16:59

## 2023-03-26 RX ADMIN — ACETAMINOPHEN 650 MG: 325 TABLET, FILM COATED ORAL at 05:33

## 2023-03-26 RX ADMIN — LABETALOL HYDROCHLORIDE 200 MG: 200 TABLET, FILM COATED ORAL at 23:21

## 2023-03-26 RX ADMIN — ACETAMINOPHEN 650 MG: 325 TABLET, FILM COATED ORAL at 23:21

## 2023-03-26 RX ADMIN — DOCUSATE SODIUM 100 MG: 100 CAPSULE, LIQUID FILLED ORAL at 08:42

## 2023-03-26 RX ADMIN — IBUPROFEN 600 MG: 600 TABLET, FILM COATED ORAL at 23:21

## 2023-03-26 RX ADMIN — ACETAMINOPHEN 650 MG: 325 TABLET, FILM COATED ORAL at 12:00

## 2023-03-26 RX ADMIN — ENOXAPARIN SODIUM 40 MG: 100 INJECTION SUBCUTANEOUS at 16:28

## 2023-03-26 RX ADMIN — Medication 10 ML: at 08:43

## 2023-03-26 NOTE — PROGRESS NOTES
DAILY PROGRESS NOTE    Patient Identification:  Name: Ann Pyle  Age: 32 y.o.  Sex: female  :  1990  MRN: [id number]               Subjective:  Interval History: Postpartum day 2 from a vaginal delivery.  The patient has been feeling some intermittent chest tightness.  EKG was negative.  Also, she has some bilateral lower extremity edema and has experienced some inspiratory wheezing on nursing exam.  Lochia is minimal.    Objective:    Scheduled Meds:docusate sodium, 100 mg, Oral, BID  furosemide, 20 mg, Intravenous, Once  labetalol, 200 mg, Oral, Q8H  prenatal vitamin, 1 tablet, Oral, Daily  sodium chloride, 10 mL, Intravenous, Q12H      Continuous Infusions:   PRN Meds:•  acetaminophen  •  all purpose nipple ointment  •  benzocaine  •  benzocaine-menthol  •  diphenhydrAMINE  •  hydrALAZINE **OR** labetalol **OR** NIFEdipine  •  HYDROcodone-acetaminophen **OR** HYDROcodone-acetaminophen  •  pramoxine-hydrocortisone  •  Hydrocortisone (Perianal)  •  ibuprofen  •  lanolin  •  magnesium hydroxide  •  ondansetron  •  ondansetron  •  sodium chloride  •  sodium chloride    Vital signs in last 24 hours:  Temp:  [98 °F (36.7 °C)-98.4 °F (36.9 °C)] 98.1 °F (36.7 °C)  Heart Rate:  [84-97] 84  Resp:  [16-19] 19  BP: (126-168)/(72-96) 166/91    Intake/Output:  No intake or output data in the 24 hours ending 23 6197    Exam:  General Appearance:    Alert, cooperative, no distress, appears stated age   Lungs:    Inspiratory wheeze per RN exam       Abdomen:    Soft, nondistended.  The fundus is firm and below the umbilicus.  It is not tender to palpation.   Extremities:  Equal in size with 2+ bipedal edema.  There is no erythema or induration.  Cords are absent.  Homans' sign is absent bilaterally.   Skin:   Skin color, texture, turgor normal, no rashes or lesions        Data Review:    Lab Results (last 24 hours)     Procedure Component Value Units Date/Time    Comprehensive Metabolic Panel [[id number]]   (Abnormal) Collected: 03/25/23 2229    Specimen: Blood Updated: 03/25/23 2257     Glucose 80 mg/dL      BUN 9 mg/dL      Creatinine 0.55 mg/dL      Sodium 141 mmol/L      Potassium 3.8 mmol/L      Chloride 109 mmol/L      CO2 23.0 mmol/L      Calcium 8.5 mg/dL      Total Protein 5.5 g/dL      Albumin 3.1 g/dL      ALT (SGPT) 12 U/L      AST (SGOT) 14 U/L      Alkaline Phosphatase 121 U/L      Total Bilirubin <0.2 mg/dL      Globulin 2.4 gm/dL      A/G Ratio 1.3 g/dL      BUN/Creatinine Ratio 16.4     Anion Gap 9.0 mmol/L      eGFR 123.5 mL/min/1.73     Narrative:      GFR Normal >60  Chronic Kidney Disease <60  Kidney Failure <15      CBC & Differential [[id number]]  (Abnormal) Collected: 03/25/23 2229    Specimen: Blood Updated: 03/25/23 2240    Narrative:      The following orders were created for panel order CBC & Differential.  Procedure                               Abnormality         Status                     ---------                               -----------         ------                     CBC Auto Differential[[id number]]        Abnormal            Final result                 Please view results for these tests on the individual orders.    CBC Auto Differential [[id number]]  (Abnormal) Collected: 03/25/23 2229    Specimen: Blood Updated: 03/25/23 2240     WBC 11.90 10*3/mm3      RBC 3.58 10*6/mm3      Hemoglobin 10.2 g/dL      Hematocrit 30.9 %      MCV 86.3 fL      MCH 28.5 pg      MCHC 33.0 g/dL      RDW 14.8 %      RDW-SD 46.6 fl      MPV 10.1 fL      Platelets 184 10*3/mm3      Neutrophil % 64.5 %      Lymphocyte % 26.7 %      Monocyte % 6.4 %      Eosinophil % 1.0 %      Basophil % 0.3 %      Immature Grans % 1.1 %      Neutrophils, Absolute 7.68 10*3/mm3      Lymphocytes, Absolute 3.18 10*3/mm3      Monocytes, Absolute 0.76 10*3/mm3      Eosinophils, Absolute 0.12 10*3/mm3      Basophils, Absolute 0.03 10*3/mm3      Immature Grans, Absolute 0.13 10*3/mm3      nRBC 0.0 /100 WBC          Assessment:     (spontaneous vaginal delivery)    Gestational hypertension without significant proteinuria in third trimester    Diet controlled gestational diabetes mellitus (GDM) in third trimester    Third degree perineal laceration during delivery    1.  Postpartum day 2 from a vaginal delivery  2.  Elevated blood pressures.  Counseled and questions answered.  The patient is on labetalol 200 mg twice daily.  She has some elevated pressures, but they are below the severe range.  I recommend decreasing labetalol to 3 times daily and the patient agrees.  3.  Bilateral lower extremity edema with chest tightness and an inspiratory wheeze.  I am concerned about fluid overload.  A more remote possibility is postpartum cardiomyopathy.  I recommend a dose of IV Lasix.  Also, we will check a chest x-ray.  If the patient's symptoms improved with IV Lasix and cardiac size is normal on chest x-ray, we will continue to manage expectantly.  If there is evidence of CHF, cardiology will be consulted.  I answered the patient's questions and she agrees with these recommendations.  4.  There is no evidence of DVT.  Lower extremities are equal in size with no cords or Homans present.  If there is not improvement after IV Lasix, consider further work-up.        Jordan James MD  3/26/2023

## 2023-03-26 NOTE — NURSING NOTE
This rn notified tim due to bp of 160/90 after repeating 30 minutes of bp 147/82. tim put in orders for labs, procardia, and labetalol.

## 2023-03-26 NOTE — LACTATION NOTE
Lactation Consult Note  Rounded on mom at this time. She is ready to BF baby, nut he is sleeping. Assisted mother in waking up the baby and latching him to the left breast in football hold. Infant latching well, has a good jaw rotation. Encouraged PT to call if she needs further help.      Evaluation Completed: 3/26/2023 10:22 EDT  Patient Name: Ann Pyle  :  1990  MRN:  [id number]     REFERRAL  INFORMATION:                          Date of Referral: 23   Person Making Referral: patient  Maternal Reason for Referral: breastfeeding currently  Infant Reason for Referral: sleepy    DELIVERY HISTORY:        Skin to skin initiation date/time: 3/24/2023  12:55 AM   Skin to skin end date/time: 3/24/2023  2:30 AM        MATERNAL ASSESSMENT:  Breast Size Issue: none (23)  Breast Shape: Bilateral:, pendulous (23)  Breast Density: Bilateral:, soft (23)  Areola: Bilateral:, elastic (23)  Nipples: Bilateral:, everted, graspable (23)                INFANT ASSESSMENT:  Information for the patient's :  Jacoby Pyle [[id number]]   No past medical history on file.     Feeding Readiness Cues: sleeping (23)      Feeding Tolerance/Success: arousal required, sleepy (23)               Feeding Interventions: arousal required, jaw supported, latch assistance provided, lips stroked, sucking promoted (23)               Breastfeeding: breastfeeding, left side only (23)   Infant Positioning: clutch/football (23)         Effective Latch During Feeding: yes (23)   Suck/Swallow/Breathing Coordination: present (23)   Signs of Milk Transfer: audible swallow (23)       Latch: 2-->grasps breast, tongue down, lips flanged, rhythmic sucking (23)   Audible Swallowin-->spontaneous and intermittent (24 hrs old) (23)   Type of Nipple: 2-->everted (after  stimulation) (03/26/23 0930)   Comfort (Breast/Nipple): 2-->soft/nontender (03/26/23 0930)   Hold (Positioning): 1-->minimal assist, teach one side, mother does other, staff holds (03/26/23 0930)   Latch Score: 9 (03/26/23 0930)                    MATERNAL INFANT FEEDING:     Maternal Emotional State: receptive, relaxed (03/26/23 0930)  Infant Positioning: clutch/football (03/26/23 0930)   Signs of Milk Transfer: audible swallow (03/26/23 0930)  Pain with Feeding: no (03/26/23 0930)           Milk Ejection Reflex: present (03/26/23 0930)           Latch Assistance: minimal assistance (03/26/23 0930)                               EQUIPMENT TYPE:                                 BREAST PUMPING:          LACTATION REFERRALS:

## 2023-03-26 NOTE — PROGRESS NOTES
Notified by RN that BP now 126/76. Good response to antihypertensive agents. Would resume vital signs per postpartum protocol. Plan to continue oral labetalol as ordered.

## 2023-03-26 NOTE — PROGRESS NOTES
Notified by RN of pt BP of 160/90. BP was taken 30 minutes after a BP reading of 147/82. Repeat BP after 160/90, which is not recorded yet but is verbally reported to be was 160/92. RN also reports pt complaining of headache.    Will give procardia 10 mg immediate release now, as per hypertensive urgency protocol, as pt does not currently have IV access. Will re-establish IV access and send CBC and CMP to eval for post partum pre-eclampsia.     Will start the patient on labetalol 200 mg PO q12 for long-term BP control.    If severe range blood pressure elevations persist, despite above therapies, will send pt to L&D to initiate magnesium therapy.

## 2023-03-26 NOTE — PROGRESS NOTES
Stat labs show normal LFTs, creatinine, and platelets. Awaiting repeat vitals after administration of IR procardia and labetalol.

## 2023-03-27 VITALS
BODY MASS INDEX: 41.54 KG/M2 | RESPIRATION RATE: 18 BRPM | SYSTOLIC BLOOD PRESSURE: 132 MMHG | TEMPERATURE: 98 F | HEIGHT: 61 IN | OXYGEN SATURATION: 100 % | WEIGHT: 220 LBS | HEART RATE: 89 BPM | DIASTOLIC BLOOD PRESSURE: 79 MMHG

## 2023-03-27 PROCEDURE — 25010000002 ENOXAPARIN PER 10 MG: Performed by: OBSTETRICS & GYNECOLOGY

## 2023-03-27 PROCEDURE — 0503F POSTPARTUM CARE VISIT: CPT | Performed by: OBSTETRICS & GYNECOLOGY

## 2023-03-27 RX ORDER — IBUPROFEN 600 MG/1
600 TABLET ORAL EVERY 6 HOURS PRN
Qty: 40 TABLET | Refills: 1 | Status: SHIPPED | OUTPATIENT
Start: 2023-03-27

## 2023-03-27 RX ORDER — LABETALOL 200 MG/1
200 TABLET, FILM COATED ORAL EVERY 8 HOURS SCHEDULED
Qty: 90 TABLET | Refills: 1 | Status: SHIPPED | OUTPATIENT
Start: 2023-03-27

## 2023-03-27 RX ORDER — FUROSEMIDE 20 MG/1
20 TABLET ORAL DAILY
Qty: 7 TABLET | Refills: 0 | Status: SHIPPED | OUTPATIENT
Start: 2023-03-27 | End: 2023-04-03

## 2023-03-27 RX ADMIN — DOCUSATE SODIUM 100 MG: 100 CAPSULE, LIQUID FILLED ORAL at 08:38

## 2023-03-27 RX ADMIN — IBUPROFEN 600 MG: 600 TABLET, FILM COATED ORAL at 08:38

## 2023-03-27 RX ADMIN — ENOXAPARIN SODIUM 40 MG: 100 INJECTION SUBCUTANEOUS at 08:37

## 2023-03-27 RX ADMIN — LABETALOL HYDROCHLORIDE 200 MG: 200 TABLET, FILM COATED ORAL at 08:38

## 2023-03-27 RX ADMIN — Medication 1 TABLET: at 08:38

## 2023-03-27 NOTE — LACTATION NOTE
Reviewed lasix use with pt when lactating. Lasix is L3. Pt reports  Put her on low dose once a day. Encouraged to cont BF or pumping every 2-3hours and stay hydrated.  Lactation Consult Note    Evaluation Completed: 3/27/2023 09:41 EDT  Patient Name: Ann Pyle  :  1990  MRN:  [id number]     REFERRAL  INFORMATION:                          Date of Referral: 23   Person Making Referral: patient  Maternal Reason for Referral: breastfeeding currently  Infant Reason for Referral: sleepy    DELIVERY HISTORY:        Skin to skin initiation date/time: 3/24/2023  12:55 AM   Skin to skin end date/time: 3/24/2023  2:30 AM        MATERNAL ASSESSMENT:     Breast Shape: pendulous (23 1400)  Breast Density: soft (23 1400)  Areola: elastic (23 1400)  Nipples: everted, graspable (23 1400)                INFANT ASSESSMENT:  Information for the patient's :  Jacoby Pyle [[id number]]   No past medical history on file.     Feeding Readiness Cues: eager, rooting (23)      Feeding Tolerance/Success: adequate pause for breath, coordinated suck/swallow/breathing, eager, rooting, strong suck (23)                                 Infant Positioning: clutch/football (23)         Effective Latch During Feeding: yes (23)   Suck/Swallow/Breathing Coordination: present (23)   Signs of Milk Transfer: deep jaw excursions noted (23)       Latch: 2-->grasps breast, tongue down, lips flanged, rhythmic sucking (23)   Audible Swallowin-->none (23)   Type of Nipple: 2-->everted (after stimulation) (23)   Comfort (Breast/Nipple): 2-->soft/nontender (23)   Hold (Positioning): 1-->minimal assist, teach one side, mother does other, staff holds (23)   Latch Score: 7 (03/24/23 1415)                    MATERNAL INFANT FEEDING:     Maternal Emotional State: relaxed (23  1400)  Infant Positioning: clutch/football (03/24/23 1400)   Signs of Milk Transfer: deep jaw excursions noted (03/24/23 1400)  Pain with Feeding: no (03/24/23 1400)                       Latch Assistance: minimal assistance (03/24/23 1400)                               EQUIPMENT TYPE:  Breast Pump Type: manual pump (03/24/23 1400)  Breast Pump Flange Type: hard (03/24/23 1400)  Breast Pump Flange Size: 27 mm (03/24/23 1400)                        BREAST PUMPING:          LACTATION REFERRALS:

## 2023-03-27 NOTE — LACTATION NOTE
Patient reports baby just latched for 10 min. Before that pt reports she pumped 24 cc's of breast milk and baby took all of that. Encouraged to cont to latch baby every 2-3 hours, pump after and give all breast milk. Baby has f/u in morning with pediatrician. Set up f/u [place]. In [facility] on Friday for weighted feed. Educated on milk coming in, baby's expected output and weight gain.  Lactation Consult Note    Evaluation Completed: 3/27/2023 08:37 EDT  Patient Name: Ann Pyle  :  1990  MRN:  [id number]     REFERRAL  INFORMATION:                          Date of Referral: 23   Person Making Referral: patient  Maternal Reason for Referral: breastfeeding currently  Infant Reason for Referral: sleepy    DELIVERY HISTORY:        Skin to skin initiation date/time: 3/24/2023  12:55 AM   Skin to skin end date/time: 3/24/2023  2:30 AM        MATERNAL ASSESSMENT:     Breast Shape: pendulous (23 1400)  Breast Density: soft (23 1400)  Areola: elastic (23 1400)  Nipples: everted, graspable (23 1400)                INFANT ASSESSMENT:  Information for the patient's :  Jacoby Pyle [[id number]]   No past medical history on file.     Feeding Readiness Cues: eager, rooting (23)      Feeding Tolerance/Success: adequate pause for breath, coordinated suck/swallow/breathing, eager, rooting, strong suck (23)                                 Infant Positioning: clutch/football (23)         Effective Latch During Feeding: yes (23)   Suck/Swallow/Breathing Coordination: present (23)   Signs of Milk Transfer: deep jaw excursions noted (23)       Latch: 2-->grasps breast, tongue down, lips flanged, rhythmic sucking (23)   Audible Swallowin-->none (23)   Type of Nipple: 2-->everted (after stimulation) (23)   Comfort (Breast/Nipple): 2-->soft/nontender (23 4815)   Hold (Positioning):  1-->minimal assist, teach one side, mother does other, staff holds (03/24/23 1415)   Latch Score: 7 (03/24/23 1415)                    MATERNAL INFANT FEEDING:     Maternal Emotional State: relaxed (03/24/23 1400)  Infant Positioning: clutch/football (03/24/23 1400)   Signs of Milk Transfer: deep jaw excursions noted (03/24/23 1400)  Pain with Feeding: no (03/24/23 1400)                       Latch Assistance: minimal assistance (03/24/23 1400)                               EQUIPMENT TYPE:  Breast Pump Type: manual pump (03/24/23 1400)  Breast Pump Flange Type: hard (03/24/23 1400)  Breast Pump Flange Size: 27 mm (03/24/23 1400)                        BREAST PUMPING:          LACTATION REFERRALS:

## 2023-03-27 NOTE — DISCHARGE INSTRUCTIONS
Routine postpartum instructions.  Nothing in the vagina.  Recommend appointment with Dr. Rocha in 1 week for blood pressure and edema check.

## 2023-03-27 NOTE — DISCHARGE SUMMARY
Date of Discharge:  3/27/2023    Discharge Diagnosis: Term vaginal delivery    Presenting Problem/History of Present Illness  Active Hospital Problems    Diagnosis  POA   • ** (spontaneous vaginal delivery) [O80]  Not Applicable   • Third degree perineal laceration during delivery [O70.20]  No   • Gestational hypertension without significant proteinuria in third trimester [O13.3]  Yes   • Diet controlled gestational diabetes mellitus (GDM) in third trimester [O24.410]  Yes      Resolved Hospital Problems    Diagnosis Date Resolved POA   • Pregnancy [Z34.90] 2023 Not Applicable        Hospital Course  Patient is a 32 y.o. female presented with induction of labor at 37 weeks 6 days for gestational hypertension.  She underwent vaginal delivery on 3/24/2023.  Please see separate history and physical and delivery summary for details.  She was doing well postpartum and tolerating pain with ibuprofen and not requiring narcotics.  She did have some severe range blood pressures and was initiated on labetalol 200 mg p.o. every 12 hours on 3/25/2023.  She had an EKG and chest x-ray due to some chest tightness on the morning of 3/26/2023 and some lower extremity edema and was kept for further monitoring of her blood pressure and for symptom control.  She had good urine output and had IV Lasix and responded very well to that.  Her chest x-ray and EKG and labs came back unremarkable.  On the morning of 3/27/2023 her blood pressures are in the normal to mild range and she is tolerating the oral labetalol.  She is stable for discharge home.  We explained that the continued lower extremity edema will continue to work off and the patient does request an oral Lasix dose to go home with.  We discussed the theoretical risk of decrease in breastmilk production but that none has been shown by studies with short-term oral Lasix after delivery.  She will see Dr. Rocha in 1 week for blood pressure check.    Procedures  Performed         Consults:   Consults     No orders found from 2/22/2023 to 3/24/2023.          Pertinent Test Results: labs: Hemoglobin 10.2    Condition on Discharge: Stable    Vital Signs  Temp:  [98.1 °F (36.7 °C)-99 °F (37.2 °C)] 98.2 °F (36.8 °C)  Heart Rate:  [78-93] 93  Resp:  [16-19] 18  BP: (130-166)/(75-91) 135/75    Physical Exam:   General Appearance: alert, appears stated age and cooperative  Abdomen: normal bowel sounds, no masses, no hepatomegaly, no splenomegaly, soft non-tender, no guarding and no rebound tenderness  Extremities: edema 2+ lower bilateral    Discharge Disposition  Home or Self Care    Discharge Medications     Discharge Medications      New Medications      Instructions Start Date   furosemide 20 MG tablet  Commonly known as: Lasix   20 mg, Oral, Daily      ibuprofen 600 MG tablet  Commonly known as: ADVIL,MOTRIN   600 mg, Oral, Every 6 Hours PRN      labetalol 200 MG tablet  Commonly known as: NORMODYNE   200 mg, Oral, Every 8 Hours Scheduled         Continue These Medications      Instructions Start Date   cyclobenzaprine 5 MG tablet  Commonly known as: FLEXERIL   5 mg, Oral, 3 Times Daily PRN      prenatal (CLASSIC) vitamin  tablet  Generic drug: prenatal vitamin   1 tablet, Oral, Daily             Discharge Diet:     Activity at Discharge:     Follow-up Appointments  Future Appointments   Date Time Provider Department Center   3/31/2023 11:00 AM [facility] LACTATION ROOM [facility]         Test Results Pending at Discharge       Doug Souza MD  03/27/23  09:05 EDT    Time: Discharge . min

## 2023-03-31 ENCOUNTER — HOSPITAL ENCOUNTER (OUTPATIENT)
Dept: LACTATION | Facility: HOSPITAL | Age: 33
Discharge: HOME | End: 2023-03-31
Payer: COMMERCIAL

## 2023-03-31 NOTE — LACTATION NOTE
Mom & infant present to [facility] for concerns infant wt loss, latch & milk supply.  Mom reports that baby was seen at [facility] Tues 3/28/23.  Baby had lost 18% from birth wt but is coming up now.  She has been pumping (after latching baby) with her Roxy Stride PBP & 24mm flanges, twice daily for 30 mins each time & is able to express 40ml on average each time.  Her Peds is aware that she is syringe feeding EBM.  Her PP course has been complicated by fluid overload & elevated BP's requiring lasix therapy.  She continues taking 20mg Lasix daily which was started last  (2 days after delivery).      Baby directly BF's 12x/day & is supplemented afterwards with approx 20ml EBM w/syringe 2-3x/day.    Infant  & today’s age: 3/24/23; 1 week    Birth wt:2910g/6-6.7     Pre-feeding wt: 2626g/5-12.6    Post-feeding wt: 2662g/5-13.9    Infant transferred: 36ml or 1.3oz    Mother reports 12 feeds/24hrs      Feeding plan/recommendations:     Continue feeding on demand 12x/day & supplementing EBM using either finger feeding with syringe & milk straw or with slow flow nipple & bottle using Paced Bottle Feeding technique.  [facility] rental information given & suggested to boost supply.  Ensure good positioning at breast using pillows to support baby at level of nipple & deep, asymmetric latch each feeding starting nose to nipple, tummy to tummy, ear stacked over shoulder stacked over hips.  When sandwiching breast make sure to compress across from baby's nose & chin to assist with deep latch.  Rotate feeding positions as able & use gentle breast massage during feeding & pumping to improve milk transfer & overall milk yield.  May roll blanket, wash cloth or hand towel & tuck under breast to help support weight of breast & bring breast out at further angle.  Break baby off if latch is or becomes shallow over the course of a feeding then restart baby.  Baby is expected to be back to birth wt by 2 weeks of age & thereafter expected wt gain  is 0.75-1 oz daily.    Follow up with Pediatrician as scheduled 23.  Follow up with [facility] next week to review & adjust lactation plan of care.       Lactation Consult Note    Evaluation Completed: 3/31/2023 12:41 EDT  Patient Name: Ann Pyle  :  1990  MRN:  [id number]     REFERRAL  INFORMATION:                          Date of Referral: 23   Person Making Referral: physician, other (see comments) (Peds)  Maternal Reason for Referral: milk supply concern  Infant Reason for Referral: weight gain inadequate      MATERNAL ASSESSMENT:     Breast Shape: Bilateral:, pendulous, round (23)  Breast Density: Bilateral:, full (23)  Areola: Bilateral:, elastic (23)  Nipples: Bilateral:, everted, graspable (23)                MATERNAL INFANT FEEDING:     Maternal Emotional State: anxious, tense (23)  Infant Positioning: cross-cradle (23)   Signs of Milk Transfer: deep jaw excursions noted, audible swallow, other (see comments) (occasional audible swallows) (23)  Pain with Feeding: no (23)                       Latch Assistance: minimal assistance, verbal guidance offered (23)                           Feeding Readiness Cues: eager, energy for feeding, hand to mouth movements (23)        Effective Latch During Feeding: yes (23)  Suck/Swallow/Breathing Coordination: present (23)     Prefeeding Weight (gm): 2626 g (92.6 oz) (23)  Postfeeding Weight (gm): 2662 g (93.9 oz) (23)  Weight Gain/Loss (gm) : 36 g (1.3 oz) (23)      Latch: 2-->grasps breast, tongue down, lips flanged, rhythmic sucking (23)  Audible Swallowin-->spontaneous and intermittent (24 hrs old) (23)  Type of Nipple: 2-->everted (after stimulation) (23)  Comfort (Breast/Nipple): 2-->soft/nontender (23 1100)  Hold (Positioning):  1-->minimal assist, teach one side, mother does other, staff holds (03/31/23 1100)  Latch Score: 9 (03/31/23 1100)      EQUIPMENT TYPE:  Breast Pump Type: double electric, personal, other (see comments) (arin keating) (03/31/23 1100)  Breast Pump Flange Type: hard (03/31/23 1100)  Breast Pump Flange Size: 24 mm (03/31/23 1100)                        BREAST PUMPING:  Breast Pumping Interventions: post-feed pumping encouraged, other (see comments) (4x/day for 15 mins at least) (03/31/23 1100)       LACTATION REFERRALS:

## 2023-04-03 ENCOUNTER — POSTPARTUM VISIT (OUTPATIENT)
Dept: OBSTETRICS AND GYNECOLOGY | Facility: CLINIC | Age: 33
End: 2023-04-03
Payer: COMMERCIAL

## 2023-04-03 VITALS
BODY MASS INDEX: 38.33 KG/M2 | HEIGHT: 61 IN | DIASTOLIC BLOOD PRESSURE: 78 MMHG | HEART RATE: 74 BPM | WEIGHT: 203 LBS | SYSTOLIC BLOOD PRESSURE: 122 MMHG

## 2023-04-03 PROCEDURE — 0503F POSTPARTUM CARE VISIT: CPT | Performed by: OBSTETRICS & GYNECOLOGY

## 2023-04-03 NOTE — PROGRESS NOTES
"Subjective    Chief Complaint   Patient presents with   • Postpartum Care     Pt s/p  3/24/23      History of Present Illness    Ann Pyle is a 32 y.o. female who presents for 1 week postpartum visit for blood pressure check.  Patient did have severe range blood pressures after her delivery.  Blood pressures at home have been fine on 200 mg 3 times daily of labetalol.  She will continue on that.  No fever or problems with her episiotomy.  Complains of some pelvic cramping but no signs of endometritis.    Obstetric History:  OB History        1    Para   1    Term   1            AB        Living   1       SAB        IAB        Ectopic        Molar        Multiple   0    Live Births   1               Menstrual History:     Patient's last menstrual period was 2022.       Past Medical History:   Diagnosis Date   • Gestational diabetes, diet controlled    • Gestational hypertension      Family History   Problem Relation Age of Onset   • Diabetes Father    • Diabetes Maternal Grandfather    • Diabetes Paternal Grandfather        The following portions of the patient's history were reviewed and updated as appropriate: allergies, current medications, past medical history, past surgical history and problem list.    Review of Systems  As per HPI       Objective   Physical Exam  Abdomen soft with no acute findings.  Minimal lower pelvic tenderness consistent with postpartum state.  1-2+ pedal edema which patient states is improving.  /78   Pulse 74   Ht 154.9 cm (61\")   Wt 92.1 kg (203 lb)   LMP 2022   Breastfeeding Yes   BMI 38.36 kg/m²     Assessment & Plan   Diagnoses and all orders for this visit:    1. Postpartum follow-up (Primary)        Plan.  Patient feeling well following her delivery 10 days ago.  Remains on labetalol for gestational hypertension.  Patient knows to follow her blood pressure and temperature at home.  If she is having elevations of either she will call. "  If fine return to office in 4 to 5 weeks for postpartum exam.

## 2023-05-08 ENCOUNTER — POSTPARTUM VISIT (OUTPATIENT)
Dept: OBSTETRICS AND GYNECOLOGY | Facility: CLINIC | Age: 33
End: 2023-05-08
Payer: COMMERCIAL

## 2023-05-08 VITALS
HEIGHT: 61 IN | DIASTOLIC BLOOD PRESSURE: 80 MMHG | SYSTOLIC BLOOD PRESSURE: 118 MMHG | BODY MASS INDEX: 37.76 KG/M2 | WEIGHT: 200 LBS

## 2023-05-08 NOTE — PROGRESS NOTES
Subjective    Chief Complaint   Patient presents with   • Postpartum Care     6wks pp vag Dr. Aj hooks, boy 6lbs 7oz, Breast Feeding      History of Present Illness    Ann yPle is a 32 y.o. female who presents for 6-week postpartum visit.  Uncomplicated vaginal delivery.  Patient due in September for Pap.  Breast-feeding.  Blood pressure 118/80 today.  Patient stopped blood pressure medicine a week ago because blood pressures were staying low.  Will use condoms as birth control.    Obstetric History:  OB History        1    Para   1    Term   1            AB        Living   1       SAB        IAB        Ectopic        Molar        Multiple   0    Live Births   1               Menstrual History:     No LMP recorded.       Past Medical History:   Diagnosis Date   • Gestational diabetes, diet controlled    • Gestational hypertension      Family History   Problem Relation Age of Onset   • Diabetes Father    • Diabetes Maternal Grandfather    • Diabetes Paternal Grandfather      Social History     Tobacco Use   Smoking Status Never   Smokeless Tobacco Not on file         The following portions of the patient's history were reviewed and updated as appropriate: allergies, current medications, past family history, past medical history, past social history, past surgical history and problem list.    Review of Systems   Constitutional: Negative.  Negative for fever and unexpected weight change.   HENT: Negative.    Respiratory: Negative for shortness of breath and wheezing.    Cardiovascular: Negative for chest pain, palpitations and leg swelling.   Gastrointestinal: Negative for abdominal pain, anal bleeding and blood in stool.   Genitourinary: Negative for dysuria, pelvic pain, urgency, vaginal bleeding, vaginal discharge and vaginal pain.   Skin: Negative.    Neurological: Negative.    Hematological: Negative.  Negative for adenopathy.   Psychiatric/Behavioral: Negative.  Negative for dysphoric  "mood. The patient is not nervous/anxious.             Objective   Physical Exam  Exam conducted with a chaperone present.   Constitutional:       Appearance: Normal appearance. She is well-developed.   HENT:      Head: Normocephalic.   Neck:      Thyroid: No thyromegaly.      Trachea: Trachea normal. No tracheal deviation.   Cardiovascular:      Rate and Rhythm: Normal rate and regular rhythm.      Heart sounds: Normal heart sounds. No murmur heard.  Pulmonary:      Effort: Pulmonary effort is normal.      Breath sounds: Normal breath sounds.   Chest:   Breasts:     Right: Normal. No mass, nipple discharge or tenderness.      Left: Normal. No mass, nipple discharge or tenderness.   Abdominal:      Palpations: Abdomen is soft. There is no mass.      Tenderness: There is no abdominal tenderness.      Hernia: No hernia is present.   Genitourinary:     General: Normal vulva.      Labia:         Right: No tenderness or lesion.         Left: No tenderness or lesion.       Urethra: No prolapse or urethral lesion.      Vagina: Normal. No vaginal discharge or lesions.      Cervix: No cervical motion tenderness.      Uterus: Not enlarged and not tender.       Adnexa:         Right: No mass or tenderness.          Left: No mass or tenderness.        Rectum: Normal. No external hemorrhoid or internal hemorrhoid. Normal anal tone.      Comments: External genitalia normal , excellent healing of third-degree episiotomy with good rectal tone.  Lymphadenopathy:      Cervical: No cervical adenopathy.      Upper Body:      Right upper body: No axillary adenopathy.      Left upper body: No axillary adenopathy.   Skin:     General: Skin is warm and dry.      Findings: No rash.   Neurological:      Mental Status: She is alert and oriented to person, place, and time.   Psychiatric:         Behavior: Behavior normal.         /80   Ht 154.9 cm (61\")   Wt 90.7 kg (200 lb)   Breastfeeding Yes   BMI 37.79 kg/m²     Assessment & Plan "   Diagnoses and all orders for this visit:    1. Postpartum follow-up (Primary)        Return to office September for Pap smear.  Condoms as birth control.

## 2023-08-17 ENCOUNTER — OFFICE VISIT (OUTPATIENT)
Dept: OBSTETRICS AND GYNECOLOGY | Facility: CLINIC | Age: 33
End: 2023-08-17
Payer: COMMERCIAL

## 2023-08-17 VITALS
WEIGHT: 202 LBS | SYSTOLIC BLOOD PRESSURE: 126 MMHG | HEIGHT: 61 IN | BODY MASS INDEX: 38.14 KG/M2 | DIASTOLIC BLOOD PRESSURE: 80 MMHG

## 2023-08-17 DIAGNOSIS — Z01.419 ENCOUNTER FOR GYNECOLOGICAL EXAMINATION WITHOUT ABNORMAL FINDING: Primary | ICD-10-CM

## 2023-08-21 LAB
CONV .: NORMAL
CYTOLOGIST CVX/VAG CYTO: NORMAL
CYTOLOGY CVX/VAG DOC CYTO: NORMAL
CYTOLOGY CVX/VAG DOC THIN PREP: NORMAL
DX ICD CODE: NORMAL
HIV 1 & 2 AB SER-IMP: NORMAL
OTHER STN SPEC: NORMAL
STAT OF ADQ CVX/VAG CYTO-IMP: NORMAL

## 2024-02-20 ENCOUNTER — TELEPHONE (OUTPATIENT)
Dept: OBSTETRICS AND GYNECOLOGY | Facility: CLINIC | Age: 34
End: 2024-02-20
Payer: COMMERCIAL

## 2024-02-27 ENCOUNTER — INITIAL PRENATAL (OUTPATIENT)
Dept: OBSTETRICS AND GYNECOLOGY | Facility: CLINIC | Age: 34
End: 2024-02-27
Payer: COMMERCIAL

## 2024-02-27 VITALS — WEIGHT: 204 LBS | SYSTOLIC BLOOD PRESSURE: 133 MMHG | DIASTOLIC BLOOD PRESSURE: 83 MMHG | BODY MASS INDEX: 38.55 KG/M2

## 2024-02-27 DIAGNOSIS — Z34.90 EARLY STAGE OF PREGNANCY: Primary | ICD-10-CM

## 2024-02-27 DIAGNOSIS — Z87.59 HISTORY OF GESTATIONAL HYPERTENSION: ICD-10-CM

## 2024-02-27 DIAGNOSIS — Z86.32 HISTORY OF GESTATIONAL DIABETES: ICD-10-CM

## 2024-02-27 DIAGNOSIS — O09.521 MULTIGRAVIDA OF ADVANCED MATERNAL AGE IN FIRST TRIMESTER: ICD-10-CM

## 2024-02-27 DIAGNOSIS — Z34.90 PREGNANCY WITH UNCERTAIN DATES, ANTEPARTUM: ICD-10-CM

## 2024-02-27 PROBLEM — O24.410 DIET CONTROLLED GESTATIONAL DIABETES MELLITUS (GDM) IN THIRD TRIMESTER: Status: RESOLVED | Noted: 2023-03-23 | Resolved: 2024-02-27

## 2024-02-27 PROBLEM — O09.529 AMA (ADVANCED MATERNAL AGE) MULTIGRAVIDA 35+: Status: ACTIVE | Noted: 2024-02-27

## 2024-02-27 PROBLEM — O13.3 GESTATIONAL HYPERTENSION WITHOUT SIGNIFICANT PROTEINURIA IN THIRD TRIMESTER: Status: RESOLVED | Noted: 2023-03-23 | Resolved: 2024-02-27

## 2024-02-27 LAB
B-HCG UR QL: POSITIVE
EXPIRATION DATE: ABNORMAL
INTERNAL NEGATIVE CONTROL: NEGATIVE
INTERNAL POSITIVE CONTROL: POSITIVE
Lab: ABNORMAL

## 2024-02-27 PROCEDURE — 0501F PRENATAL FLOW SHEET: CPT | Performed by: OBSTETRICS & GYNECOLOGY

## 2024-02-27 RX ORDER — ASPIRIN 81 MG/1
81 TABLET ORAL DAILY
Qty: 90 TABLET | Refills: 2 | Status: SHIPPED | OUTPATIENT
Start: 2024-02-27

## 2024-02-27 NOTE — PROGRESS NOTES
Chief Complaint   Patient presents with    Initial Prenatal Visit     HPI- Pt is 33 y.o.  at 8w1d here for prenatal visit.  She presents for initial OB visit.  She is sure regarding her LMP.  She had a vaginal delivery at 37 weeks 11 months ago.  That pregnancy was complicated by diet-controlled gestational diabetes and gestational hypertension at the end.  She does report some nausea today, but overall feels well and has no major complaints.  She has noticed some palpitations since she found out she was pregnant and states that she had these with her last pregnancy, but they resolved after delivery.    ROS-     - No vaginal bleeding    GI- No abdominal pain    /83   Wt 92.5 kg (204 lb)   LMP 2024   BMI 38.55 kg/m²   Exam - See flow sheet    Fetal heart rate is normal    Assessment-  Diagnoses and all orders for this visit:    Early stage of pregnancy  -     OB Panel With HIV  -     Urine Culture - Urine, Urine, Clean Catch  -     Chlamydia trachomatis, Neisseria gonorrhoeae, Trichomonas vaginalis, PCR - Swab, Vagina    Pregnancy with uncertain dates, antepartum  -     US Ob Transvaginal; Future    History of gestational diabetes  -     Hemoglobin A1c    History of gestational hypertension  -     aspirin 81 MG EC tablet; Take 1 tablet by mouth Daily.    Multigravida of advanced maternal age in first trimester  -     aspirin 81 MG EC tablet; Take 1 tablet by mouth Daily.    Initial OB counseling was done.  Recommend starting aspirin due to advanced maternal age and history of gestational hypertension.  OB labs and ultrasound were ordered and she will follow-up in 4 weeks.  Discussed NIPT testing's visit if she desires.

## 2024-02-29 LAB
ABO GROUP BLD: NORMAL
BACTERIA UR CULT: NORMAL
BACTERIA UR CULT: NORMAL
BASOPHILS # BLD AUTO: 0 X10E3/UL (ref 0–0.2)
BASOPHILS NFR BLD AUTO: 0 %
BLD GP AB SCN SERPL QL: NEGATIVE
C TRACH RRNA SPEC QL NAA+PROBE: NEGATIVE
EOSINOPHIL # BLD AUTO: 0.1 X10E3/UL (ref 0–0.4)
EOSINOPHIL NFR BLD AUTO: 1 %
ERYTHROCYTE [DISTWIDTH] IN BLOOD BY AUTOMATED COUNT: 13.2 % (ref 11.7–15.4)
HBA1C MFR BLD: 5.1 % (ref 4.8–5.6)
HBV SURFACE AG SERPL QL IA: NEGATIVE
HCT VFR BLD AUTO: 42 % (ref 34–46.6)
HCV IGG SERPL QL IA: NON REACTIVE
HGB BLD-MCNC: 13.5 G/DL (ref 11.1–15.9)
HIV 1+2 AB+HIV1 P24 AG SERPL QL IA: NON REACTIVE
IMM GRANULOCYTES # BLD AUTO: 0 X10E3/UL (ref 0–0.1)
IMM GRANULOCYTES NFR BLD AUTO: 0 %
LYMPHOCYTES # BLD AUTO: 3.1 X10E3/UL (ref 0.7–3.1)
LYMPHOCYTES NFR BLD AUTO: 31 %
MCH RBC QN AUTO: 29 PG (ref 26.6–33)
MCHC RBC AUTO-ENTMCNC: 32.1 G/DL (ref 31.5–35.7)
MCV RBC AUTO: 90 FL (ref 79–97)
MONOCYTES # BLD AUTO: 0.4 X10E3/UL (ref 0.1–0.9)
MONOCYTES NFR BLD AUTO: 4 %
N GONORRHOEA RRNA SPEC QL NAA+PROBE: NEGATIVE
NEUTROPHILS # BLD AUTO: 6.2 X10E3/UL (ref 1.4–7)
NEUTROPHILS NFR BLD AUTO: 64 %
PLATELET # BLD AUTO: 222 X10E3/UL (ref 150–450)
RBC # BLD AUTO: 4.65 X10E6/UL (ref 3.77–5.28)
RH BLD: POSITIVE
RPR SER QL: NON REACTIVE
RUBV IGG SERPL IA-ACNC: 8.33 INDEX
T VAGINALIS RRNA SPEC QL NAA+PROBE: NEGATIVE
WBC # BLD AUTO: 9.9 X10E3/UL (ref 3.4–10.8)

## 2024-03-25 ENCOUNTER — ROUTINE PRENATAL (OUTPATIENT)
Dept: OBSTETRICS AND GYNECOLOGY | Facility: CLINIC | Age: 34
End: 2024-03-25
Payer: COMMERCIAL

## 2024-03-25 VITALS — SYSTOLIC BLOOD PRESSURE: 122 MMHG | DIASTOLIC BLOOD PRESSURE: 76 MMHG | WEIGHT: 205 LBS | BODY MASS INDEX: 38.73 KG/M2

## 2024-03-25 DIAGNOSIS — O09.521 MULTIGRAVIDA OF ADVANCED MATERNAL AGE IN FIRST TRIMESTER: Primary | ICD-10-CM

## 2024-03-25 DIAGNOSIS — Z36.0 ENCOUNTER FOR ANTENATAL SCREENING FOR CHROMOSOMAL ANOMALIES: ICD-10-CM

## 2024-03-25 DIAGNOSIS — Z13.89 SCREENING FOR BLOOD OR PROTEIN IN URINE: ICD-10-CM

## 2024-03-25 DIAGNOSIS — Z87.59 HISTORY OF GESTATIONAL HYPERTENSION: ICD-10-CM

## 2024-03-25 DIAGNOSIS — Z3A.10 10 WEEKS GESTATION OF PREGNANCY: ICD-10-CM

## 2024-03-25 DIAGNOSIS — Z86.32 HISTORY OF GESTATIONAL DIABETES: ICD-10-CM

## 2024-03-25 PROCEDURE — 0502F SUBSEQUENT PRENATAL CARE: CPT | Performed by: OBSTETRICS & GYNECOLOGY

## 2024-03-25 RX ORDER — PRENATAL VIT/IRON FUM/FOLIC AC 27MG-0.8MG
TABLET ORAL DAILY
COMMUNITY

## 2024-03-25 NOTE — PROGRESS NOTES
CC:  Pregnancy  Patient is doing well with no major complaints.  She states her nausea is manageable.  Reviewed initial OB labs and ultrasound and discuss change in NATHALY.  She does desire cell free DNA testing and order was placed.  A/P: Supervision of pregnancy at 10 weeks with advanced maternal age  --Follow-up in 4 weeks

## 2024-03-27 LAB
GLUCOSE UR STRIP-MCNC: NEGATIVE MG/DL
PROT UR STRIP-MCNC: NEGATIVE MG/DL

## 2024-03-29 LAB
CFDNA.FET/CFDNA.TOTAL SFR FETUS: NORMAL %
CITATION REF LAB TEST: NORMAL
FET 13+18+21+X+Y ANEUP PLAS.CFDNA: NEGATIVE
FET CHR 21 TS PLAS.CFDNA QL: NEGATIVE
FET SEX PLAS.CFDNA DOSAGE CFDNA: NORMAL
FET TS 13 RISK PLAS.CFDNA QL: NEGATIVE
FET TS 18 RISK WBC.DNA+CFDNA QL: NEGATIVE
GA EST FROM CONCEPTION DATE: NORMAL D
GESTATIONAL AGE > 9:: YES
LAB DIRECTOR NAME PROVIDER: NORMAL
LAB DIRECTOR NAME PROVIDER: NORMAL
LABORATORY COMMENT REPORT: NORMAL
LIMITATIONS OF THE TEST: NORMAL
NEGATIVE PREDICTIVE VALUE: NORMAL
NOTE: NORMAL
PERFORMANCE CHARACTERISTICS: NORMAL
POSITIVE PREDICTIVE VALUE: NORMAL
REF LAB TEST METHOD: NORMAL
TEST PERFORMANCE INFO SPEC: NORMAL

## 2024-04-01 ENCOUNTER — TELEPHONE (OUTPATIENT)
Dept: OBSTETRICS AND GYNECOLOGY | Facility: CLINIC | Age: 34
End: 2024-04-01
Payer: COMMERCIAL

## 2024-04-01 NOTE — TELEPHONE ENCOUNTER
Gave Pt call no answer/disconnected regarding genetic screening low risk for down syndrome , low risk for trisomy 18 and 13, if pt would like to know gender its a boy.    Patient was called in regards to her 6/29/2018 appointment. Appointment needs to be rescheduled as that is not an available appointment time, per Julienne. A message was left asking the patient to call the clinic back to reschedule.

## 2024-04-26 ENCOUNTER — ROUTINE PRENATAL (OUTPATIENT)
Dept: OBSTETRICS AND GYNECOLOGY | Facility: CLINIC | Age: 34
End: 2024-04-26
Payer: COMMERCIAL

## 2024-04-26 VITALS — WEIGHT: 206 LBS | BODY MASS INDEX: 38.92 KG/M2 | DIASTOLIC BLOOD PRESSURE: 75 MMHG | SYSTOLIC BLOOD PRESSURE: 115 MMHG

## 2024-04-26 DIAGNOSIS — Z86.32 HISTORY OF GESTATIONAL DIABETES: ICD-10-CM

## 2024-04-26 DIAGNOSIS — Z13.89 SCREENING FOR BLOOD OR PROTEIN IN URINE: ICD-10-CM

## 2024-04-26 DIAGNOSIS — Z36.1 ENCOUNTER FOR ANTENATAL SCREENING FOR HIGH ALPHA-FETOPROTEIN LEVEL: ICD-10-CM

## 2024-04-26 DIAGNOSIS — Z36.89 ENCOUNTER FOR FETAL ANATOMIC SURVEY: ICD-10-CM

## 2024-04-26 DIAGNOSIS — Z87.59 HISTORY OF GESTATIONAL HYPERTENSION: ICD-10-CM

## 2024-04-26 DIAGNOSIS — Z3A.15 15 WEEKS GESTATION OF PREGNANCY: ICD-10-CM

## 2024-04-26 DIAGNOSIS — O09.522 MULTIGRAVIDA OF ADVANCED MATERNAL AGE IN SECOND TRIMESTER: Primary | ICD-10-CM

## 2024-04-26 DIAGNOSIS — Z36.86 ENCOUNTER FOR ANTENATAL SCREENING FOR CERVICAL LENGTH: ICD-10-CM

## 2024-04-26 LAB
GLUCOSE UR STRIP-MCNC: NEGATIVE MG/DL
PROT UR STRIP-MCNC: NEGATIVE MG/DL

## 2024-04-26 PROCEDURE — 0502F SUBSEQUENT PRENATAL CARE: CPT | Performed by: OBSTETRICS & GYNECOLOGY

## 2024-04-26 NOTE — PROGRESS NOTES
Chief Complaint   Patient presents with    Routine Prenatal Visit     HPI- Pt is 33 y.o.  at 15w1d here for prenatal visit.  She does report acid reflux and is using Tums.    ROS-     - No vaginal bleeding    GI- No abdominal pain    /75   Wt 93.4 kg (206 lb)   LMP 2024   BMI 38.92 kg/m²   Exam - See flow sheet    Fetal heart rate is normal    Assessment-  Diagnoses and all orders for this visit:    Multigravida of advanced maternal age in second trimester    Encounter for  screening for high alpha-fetoprotein level  -     Alpha Fetoprotein, Maternal    History of gestational diabetes    History of gestational hypertension    15 weeks gestation of pregnancy    Encounter for fetal anatomic survey  -     US Ob 14 + Weeks Single or First Gestation; Future    Encounter for  screening for cervical length  -     US Ob Transvaginal; Future    I discussed Pepcid that she may also use for reflux.  She had normal cell free DNA testing.  She was offered screening for spina bifida and desires.  She will follow-up in 4 weeks with anatomy ultrasound.

## 2024-04-29 LAB
AFP INTERP SERPL-IMP: NORMAL
AFP INTERP SERPL-IMP: NORMAL
AFP MOM SERPL: 0.85
AFP SERPL-MCNC: 20.8 NG/ML
AGE AT DELIVERY: 36.2 YR
GA METHOD: NORMAL
GA: 15.1 WEEKS
IDDM PATIENT QL: NO
LABORATORY COMMENT REPORT: NORMAL
MULTIPLE PREGNANCY: NO
NEURAL TUBE DEFECT RISK FETUS: NORMAL %
RESULT: NORMAL

## 2024-05-22 ENCOUNTER — ROUTINE PRENATAL (OUTPATIENT)
Dept: OBSTETRICS AND GYNECOLOGY | Facility: CLINIC | Age: 34
End: 2024-05-22
Payer: COMMERCIAL

## 2024-05-22 VITALS — BODY MASS INDEX: 39.87 KG/M2 | SYSTOLIC BLOOD PRESSURE: 130 MMHG | WEIGHT: 211 LBS | DIASTOLIC BLOOD PRESSURE: 75 MMHG

## 2024-05-22 DIAGNOSIS — Z13.89 SCREENING FOR BLOOD OR PROTEIN IN URINE: ICD-10-CM

## 2024-05-22 DIAGNOSIS — Z3A.18 18 WEEKS GESTATION OF PREGNANCY: ICD-10-CM

## 2024-05-22 DIAGNOSIS — Z86.32 HISTORY OF GESTATIONAL DIABETES: ICD-10-CM

## 2024-05-22 DIAGNOSIS — Z87.59 HISTORY OF GESTATIONAL HYPERTENSION: ICD-10-CM

## 2024-05-22 DIAGNOSIS — O09.522 MULTIGRAVIDA OF ADVANCED MATERNAL AGE IN SECOND TRIMESTER: Primary | ICD-10-CM

## 2024-05-22 DIAGNOSIS — Z36.2 ENCOUNTER FOR FOLLOW-UP ULTRASOUND OF FETAL ANATOMY: ICD-10-CM

## 2024-05-22 LAB
GLUCOSE UR STRIP-MCNC: NEGATIVE MG/DL
PROT UR STRIP-MCNC: NEGATIVE MG/DL

## 2024-05-22 NOTE — PROGRESS NOTES
Chief Complaint   Patient presents with    Routine Prenatal Visit     HPI- Pt is 33 y.o.  at 18w6d here for prenatal visit.  She is doing well and has no complaints.  She has started to feel some fetal movement.    ROS-     - No vaginal bleeding    GI- No abdominal pain    /75   Wt 95.7 kg (211 lb)   LMP 2024   BMI 39.87 kg/m²   Exam - See flow sheet    Fetal heart rate is normal    Assessment-  Diagnoses and all orders for this visit:    Multigravida of advanced maternal age in second trimester    History of gestational diabetes    History of gestational hypertension    18 weeks gestation of pregnancy    Encounter for follow-up ultrasound of fetal anatomy  -     US Ob Follow Up Transabdominal Approach; Future    Anatomy ultrasound was performed today.  It showed normal-appearing fetal anatomy, but fetal heart could not be well-visualized and discussed recommendations to repeat ultrasound in 4 weeks.

## 2024-06-13 ENCOUNTER — TELEPHONE (OUTPATIENT)
Dept: OBSTETRICS AND GYNECOLOGY | Facility: CLINIC | Age: 34
End: 2024-06-13
Payer: COMMERCIAL

## 2024-06-13 NOTE — TELEPHONE ENCOUNTER
pt     Pt is 22 weeks pregnant she is having some itching all over body and wants to know if that is something she should be worried about?    Please advise

## 2024-06-13 NOTE — TELEPHONE ENCOUNTER
I recommend she try benadryl and if she develops a rash to contact the office. I would let her know to follow up and discuss this with Dr. Kim at her next appt on 6/19/24.

## 2024-06-19 ENCOUNTER — ROUTINE PRENATAL (OUTPATIENT)
Dept: OBSTETRICS AND GYNECOLOGY | Facility: CLINIC | Age: 34
End: 2024-06-19
Payer: COMMERCIAL

## 2024-06-19 VITALS — DIASTOLIC BLOOD PRESSURE: 75 MMHG | SYSTOLIC BLOOD PRESSURE: 128 MMHG | BODY MASS INDEX: 40.25 KG/M2 | WEIGHT: 213 LBS

## 2024-06-19 DIAGNOSIS — Z13.89 SCREENING FOR BLOOD OR PROTEIN IN URINE: ICD-10-CM

## 2024-06-19 DIAGNOSIS — Z86.32 HISTORY OF GESTATIONAL DIABETES: ICD-10-CM

## 2024-06-19 DIAGNOSIS — O09.522 MULTIGRAVIDA OF ADVANCED MATERNAL AGE IN SECOND TRIMESTER: Primary | ICD-10-CM

## 2024-06-19 DIAGNOSIS — O99.712 PRURITUS OF PREGNANCY IN SECOND TRIMESTER: ICD-10-CM

## 2024-06-19 DIAGNOSIS — Z11.3 SCREEN FOR STD (SEXUALLY TRANSMITTED DISEASE): ICD-10-CM

## 2024-06-19 DIAGNOSIS — Z13.0 SCREENING FOR IRON DEFICIENCY ANEMIA: ICD-10-CM

## 2024-06-19 DIAGNOSIS — L29.9 PRURITUS OF PREGNANCY IN SECOND TRIMESTER: ICD-10-CM

## 2024-06-19 DIAGNOSIS — Z87.59 HISTORY OF GESTATIONAL HYPERTENSION: ICD-10-CM

## 2024-06-19 LAB
GLUCOSE UR STRIP-MCNC: NEGATIVE MG/DL
PROT UR STRIP-MCNC: ABNORMAL MG/DL

## 2024-06-19 NOTE — PROGRESS NOTES
Chief Complaint   Patient presents with    Routine Prenatal Visit     HPI- Pt is 33 y.o.  at 22w6d here for prenatal visit.  Patient complains of itching on her arms and legs and states it is not every day, but she will typically notice it more at night.  She is feeling fetal movement.    ROS-     - No vaginal bleeding    GI- No abdominal pain    /75   Wt 96.6 kg (213 lb)   LMP 2024   BMI 40.25 kg/m²   Exam - See flow sheet    Fetal heart rate is normal    Assessment-  Diagnoses and all orders for this visit:    Multigravida of advanced maternal age in second trimester    Screening for blood or protein in urine  -     POC Urinalysis Dipstick    History of gestational hypertension    History of gestational diabetes  -     Cancel: Gestational Screen 1 Hr ([facility])    Screening for iron deficiency anemia  -     CBC (No Diff)    Screen for STD (sexually transmitted disease)  -     Cancel: T Pallidum Antibody w/ reflex RPR (Syphilis)    Pruritus of pregnancy in second trimester  -     Comprehensive Metabolic Panel  -     Bile Acids, Total    Discussed with patient that her pruritus is likely from the pregnancy hormones and discussed Benadryl as needed for symptoms.  We will check labs to rule out cholestasis.  Repeat anatomy ultrasound today shows normal-appearing fetal heart and ultrasound was reviewed with her.  She will follow-up in 4 weeks for glucose test and instructions were discussed.

## 2024-06-20 LAB
ALBUMIN SERPL-MCNC: 3.8 G/DL (ref 3.9–4.9)
ALP SERPL-CCNC: 78 IU/L (ref 44–121)
ALT SERPL-CCNC: 17 IU/L (ref 0–32)
AST SERPL-CCNC: 16 IU/L (ref 0–40)
BILE AC SERPL-SCNC: 2.1 UMOL/L (ref 0–10)
BILIRUB SERPL-MCNC: 0.2 MG/DL (ref 0–1.2)
BUN SERPL-MCNC: 10 MG/DL (ref 6–20)
BUN/CREAT SERPL: 20 (ref 9–23)
CALCIUM SERPL-MCNC: 9.4 MG/DL (ref 8.7–10.2)
CHLORIDE SERPL-SCNC: 101 MMOL/L (ref 96–106)
CO2 SERPL-SCNC: 20 MMOL/L (ref 20–29)
CREAT SERPL-MCNC: 0.49 MG/DL (ref 0.57–1)
EGFRCR SERPLBLD CKD-EPI 2021: 126 ML/MIN/1.73
ERYTHROCYTE [DISTWIDTH] IN BLOOD BY AUTOMATED COUNT: 13.3 % (ref 11.7–15.4)
GLOBULIN SER CALC-MCNC: 2.7 G/DL (ref 1.5–4.5)
GLUCOSE SERPL-MCNC: 78 MG/DL (ref 70–99)
HCT VFR BLD AUTO: 39.6 % (ref 34–46.6)
HGB BLD-MCNC: 13 G/DL (ref 11.1–15.9)
MCH RBC QN AUTO: 29 PG (ref 26.6–33)
MCHC RBC AUTO-ENTMCNC: 32.8 G/DL (ref 31.5–35.7)
MCV RBC AUTO: 88 FL (ref 79–97)
PLATELET # BLD AUTO: 235 X10E3/UL (ref 150–450)
POTASSIUM SERPL-SCNC: 3.6 MMOL/L (ref 3.5–5.2)
PROT SERPL-MCNC: 6.5 G/DL (ref 6–8.5)
RBC # BLD AUTO: 4.48 X10E6/UL (ref 3.77–5.28)
SODIUM SERPL-SCNC: 136 MMOL/L (ref 134–144)
WBC # BLD AUTO: 11.9 X10E3/UL (ref 3.4–10.8)